# Patient Record
Sex: MALE | Race: WHITE | NOT HISPANIC OR LATINO | Employment: OTHER | URBAN - METROPOLITAN AREA
[De-identification: names, ages, dates, MRNs, and addresses within clinical notes are randomized per-mention and may not be internally consistent; named-entity substitution may affect disease eponyms.]

---

## 2017-02-04 ENCOUNTER — HOSPITAL ENCOUNTER (EMERGENCY)
Facility: CLINIC | Age: 47
Discharge: HOME | End: 2017-02-04
Attending: EMERGENCY MEDICINE | Admitting: EMERGENCY MEDICINE
Payer: MEDICAID

## 2017-02-04 ENCOUNTER — APPOINTMENT (OUTPATIENT)
Dept: ULTRASOUND IMAGING | Facility: CLINIC | Age: 47
End: 2017-02-04
Attending: EMERGENCY MEDICINE
Payer: MEDICAID

## 2017-02-04 VITALS
OXYGEN SATURATION: 97 % | BODY MASS INDEX: 23.1 KG/M2 | RESPIRATION RATE: 18 BRPM | HEART RATE: 66 BPM | HEIGHT: 71 IN | TEMPERATURE: 98.6 F | WEIGHT: 165 LBS | SYSTOLIC BLOOD PRESSURE: 125 MMHG | DIASTOLIC BLOOD PRESSURE: 73 MMHG

## 2017-02-04 DIAGNOSIS — N45.1 EPIDIDYMITIS, RIGHT: ICD-10-CM

## 2017-02-04 LAB
ALBUMIN UR-MCNC: NEGATIVE MG/DL
APPEARANCE UR: CLEAR
BACTERIA #/AREA URNS HPF: ABNORMAL /HPF
BILIRUB UR QL STRIP: NEGATIVE
COLOR UR AUTO: YELLOW
GLUCOSE UR STRIP-MCNC: NEGATIVE MG/DL
HGB UR QL STRIP: ABNORMAL
KETONES UR STRIP-MCNC: NEGATIVE MG/DL
LEUKOCYTE ESTERASE UR QL STRIP: ABNORMAL
MUCOUS THREADS #/AREA URNS LPF: PRESENT /LPF
NITRATE UR QL: NEGATIVE
PH UR STRIP: 5.5 PH (ref 5–7)
RBC #/AREA URNS AUTO: 19 /HPF (ref 0–2)
SP GR UR STRIP: 1.02 (ref 1–1.03)
SQUAMOUS #/AREA URNS AUTO: <1 /HPF (ref 0–1)
URN SPEC COLLECT METH UR: ABNORMAL
UROBILINOGEN UR STRIP-MCNC: NORMAL MG/DL (ref 0–2)
WBC #/AREA URNS AUTO: 22 /HPF (ref 0–2)

## 2017-02-04 PROCEDURE — 81001 URINALYSIS AUTO W/SCOPE: CPT | Performed by: EMERGENCY MEDICINE

## 2017-02-04 PROCEDURE — 25000132 ZZH RX MED GY IP 250 OP 250 PS 637: Performed by: EMERGENCY MEDICINE

## 2017-02-04 PROCEDURE — 87086 URINE CULTURE/COLONY COUNT: CPT | Performed by: EMERGENCY MEDICINE

## 2017-02-04 PROCEDURE — 99284 EMERGENCY DEPT VISIT MOD MDM: CPT | Mod: 25

## 2017-02-04 PROCEDURE — 87088 URINE BACTERIA CULTURE: CPT | Performed by: EMERGENCY MEDICINE

## 2017-02-04 PROCEDURE — 93976 VASCULAR STUDY: CPT

## 2017-02-04 PROCEDURE — 87591 N.GONORRHOEAE DNA AMP PROB: CPT | Performed by: EMERGENCY MEDICINE

## 2017-02-04 PROCEDURE — 87186 SC STD MICRODIL/AGAR DIL: CPT | Performed by: EMERGENCY MEDICINE

## 2017-02-04 PROCEDURE — 87491 CHLMYD TRACH DNA AMP PROBE: CPT | Performed by: EMERGENCY MEDICINE

## 2017-02-04 RX ORDER — HYDROCODONE BITARTRATE AND ACETAMINOPHEN 5; 325 MG/1; MG/1
1 TABLET ORAL ONCE
Status: COMPLETED | OUTPATIENT
Start: 2017-02-04 | End: 2017-02-04

## 2017-02-04 RX ORDER — OXYCODONE HYDROCHLORIDE 5 MG/1
5 TABLET ORAL EVERY 6 HOURS PRN
Qty: 12 TABLET | Refills: 0 | Status: ON HOLD | OUTPATIENT
Start: 2017-02-04 | End: 2017-02-09

## 2017-02-04 RX ORDER — DOXYCYCLINE 100 MG/1
100 CAPSULE ORAL 2 TIMES DAILY
Qty: 14 CAPSULE | Refills: 0 | Status: ON HOLD | OUTPATIENT
Start: 2017-02-04 | End: 2017-02-09

## 2017-02-04 RX ORDER — IBUPROFEN 600 MG/1
600 TABLET, FILM COATED ORAL ONCE
Status: COMPLETED | OUTPATIENT
Start: 2017-02-04 | End: 2017-02-04

## 2017-02-04 RX ORDER — LIDOCAINE/PRILOCAINE 2.5 %-2.5%
1 CREAM (GRAM) TOPICAL ONCE
Status: DISCONTINUED | OUTPATIENT
Start: 2017-02-04 | End: 2017-02-04 | Stop reason: HOSPADM

## 2017-02-04 RX ORDER — IBUPROFEN 600 MG/1
600 TABLET, FILM COATED ORAL EVERY 6 HOURS PRN
Qty: 30 TABLET | Refills: 1 | Status: ON HOLD | OUTPATIENT
Start: 2017-02-04 | End: 2017-02-09

## 2017-02-04 RX ORDER — LIDOCAINE/PRILOCAINE 2.5 %-2.5%
CREAM (GRAM) TOPICAL
Status: DISCONTINUED
Start: 2017-02-04 | End: 2017-02-04 | Stop reason: HOSPADM

## 2017-02-04 RX ADMIN — IBUPROFEN 600 MG: 600 TABLET ORAL at 10:12

## 2017-02-04 RX ADMIN — HYDROCODONE BITARTRATE AND ACETAMINOPHEN 1 TABLET: 5; 325 TABLET ORAL at 10:12

## 2017-02-04 RX ADMIN — HYDROCODONE BITARTRATE AND ACETAMINOPHEN 1 TABLET: 5; 325 TABLET ORAL at 11:43

## 2017-02-04 ASSESSMENT — ENCOUNTER SYMPTOMS
DYSURIA: 0
FEVER: 0

## 2017-02-04 NOTE — DISCHARGE INSTRUCTIONS
Epididymitis  Inflammation of the epididymis can cause pain and swelling in your scrotum. The epididymis is a small tube next to the testicle that stores sperm. Epididymitis is usually caused by an infection. In sexually active men, it is often caused by a sexually transmitted disease (STD) such as chlamydia or gonorrhea. In boys and in men over 40, it can be from bacteria from other parts of the urinary tract (not an STD infection).  Symptoms may begin with pain in the lower belly (abdomen) or low back. The pain then spreads down into the scrotum. Usually only one side is affected. The testicle and scrotum swell and become very painful. You may have fever and a burning when passing urine. Sometimes you may have a discharge from the penis.  Treatment is with antibiotics, and anti-inflammatory and pain medicines. The condition should get better over the first few days of treatment. But it will take several weeks for all the swelling and discomfort to go away. If your healthcare provider suspects that an STD is the cause, your sexual partners must also be treated.  Home care  The following will help you care for yourself at home:    Support the scrotum. When lying down, place a rolled towel under the scrotum. When walking, use an athletic supporter or 2 pairs of jockey-style underwear.    To relieve pain, put ice packs on the inflamed area. You can make your own ice pack by putting ice cubes in a sealed plastic bag wrapped in a towel.    You may use acetaminophen or ibuprofen to control pain, unless another medicine was prescribed. If you have chronic liver or kidney disease, talk with your healthcare provider before taking these medicines. Also talk with your provider if you've ever had a stomach ulcer or GI bleeding.    Rest in bed for the first few days until the fever, pain, and swelling get better. It may take several weeks for all of the swelling to go away.    Constipation can make you strain. This makes the  pain worse. Avoid constipation by eating natural laxatives such as prunes, fresh fruits, and whole-grain cereals. If necessary, use a mild over-the-counter laxative for constipation. Mineral oil can be used to keep the stools soft.    Do not have sex until you have finished all treatment and all symptoms have cleared.    Take all medicine as directed. Do not miss any doses and do not stop early even if you feel better.  Follow-up care  Follow up with your doctor, a urologist, or as advised by our staff to be sure you are responding properly to treatment. If a culture was taken, you may call for the result as directed. A culture test can ensure that you are on the correct antibiotic.   When to seek medical advice  Call your healthcare provider right away if any of these occur:    Fever of more than 100.4 F (38.0 C) after 3 days of treatment    Increasing pain or swelling of the testicle after starting treatment    Pressure or pain in your bladder that gets worse    Unable to pass urine for 8 hours    3274-1148 [employer]. [address] [place] [address], [place] [address]. All rights reserved. This information is not intended as a substitute for professional medical care. Always follow your healthcare professional's instructions.

## 2017-02-04 NOTE — ED AVS SNAPSHOT
Emergency Department    [address] [place] [address]    Phone:  [phone]    Fax:  [phone]                                       Frandy Lemus   MRN: [id number]    Department:   Emergency Department   Date of Visit:  2/4/2017           Patient Information     Date Of Birth          1970        Your diagnoses for this visit were:     Epididymitis, right        You were seen by Judson Lopez MD.      Follow-up Information     Follow up with [facility] In 1 week.    Why:  As needed    Contact information:    [address]  [address]  [place] [phone]        Discharge Instructions         Epididymitis  Inflammation of the epididymis can cause pain and swelling in your scrotum. The epididymis is a small tube next to the testicle that stores sperm. Epididymitis is usually caused by an infection. In sexually active men, it is often caused by a sexually transmitted disease (STD) such as chlamydia or gonorrhea. In boys and in men over 40, it can be from bacteria from other parts of the urinary tract (not an STD infection).  Symptoms may begin with pain in the lower belly (abdomen) or low back. The pain then spreads down into the scrotum. Usually only one side is affected. The testicle and scrotum swell and become very painful. You may have fever and a burning when passing urine. Sometimes you may have a discharge from the penis.  Treatment is with antibiotics, and anti-inflammatory and pain medicines. The condition should get better over the first few days of treatment. But it will take several weeks for all the swelling and discomfort to go away. If your healthcare provider suspects that an STD is the cause, your sexual partners must also be treated.  Home care  The following will help you care for yourself at home:    Support the scrotum. When lying down, place a rolled towel under the scrotum. When walking, use an athletic supporter or  2 pairs of jockey-style underwear.    To relieve pain, put ice packs on the inflamed area. You can make your own ice pack by putting ice cubes in a sealed plastic bag wrapped in a towel.    You may use acetaminophen or ibuprofen to control pain, unless another medicine was prescribed. If you have chronic liver or kidney disease, talk with your healthcare provider before taking these medicines. Also talk with your provider if you've ever had a stomach ulcer or GI bleeding.    Rest in bed for the first few days until the fever, pain, and swelling get better. It may take several weeks for all of the swelling to go away.    Constipation can make you strain. This makes the pain worse. Avoid constipation by eating natural laxatives such as prunes, fresh fruits, and whole-grain cereals. If necessary, use a mild over-the-counter laxative for constipation. Mineral oil can be used to keep the stools soft.    Do not have sex until you have finished all treatment and all symptoms have cleared.    Take all medicine as directed. Do not miss any doses and do not stop early even if you feel better.  Follow-up care  Follow up with your doctor, a urologist, or as advised by our staff to be sure you are responding properly to treatment. If a culture was taken, you may call for the result as directed. A culture test can ensure that you are on the correct antibiotic.   When to seek medical advice  Call your healthcare provider right away if any of these occur:    Fever of more than 100.4 F (38.0 C) after 3 days of treatment    Increasing pain or swelling of the testicle after starting treatment    Pressure or pain in your bladder that gets worse    Unable to pass urine for 8 hours    4056-6503 [employer]. [address] [place] [address], [place] [address]. All rights reserved. This information is not intended as a substitute for professional medical care. Always follow your healthcare professional's instructions.          24 Hour  Appointment Hotline       To make an appointment at any [facility], call [phone] ([phone]). If you don't have a family doctor or clinic, we will help you find one. [facility] are conveniently located to serve the needs of you and your family.             Review of your medicines      START taking        Dose / Directions Last dose taken    doxycycline 100 MG capsule   Commonly known as:  VIBRAMYCIN   Dose:  100 mg   Quantity:  14 capsule        Take 1 capsule (100 mg) by mouth 2 times daily for 14 days   Refills:  0        ibuprofen 600 MG tablet   Commonly known as:  ADVIL/MOTRIN   Dose:  600 mg   Quantity:  30 tablet        Take 1 tablet (600 mg) by mouth every 6 hours as needed for moderate pain   Refills:  1        oxyCODONE 5 MG IR tablet   Commonly known as:  ROXICODONE   Dose:  5 mg   Quantity:  12 tablet        Take 1 tablet (5 mg) by mouth every 6 hours as needed for pain   Refills:  0                Prescriptions were sent or printed at these locations (3 Prescriptions)                   Other Prescriptions                Printed at Department/Unit printer (3 of 3)         doxycycline (VIBRAMYCIN) 100 MG capsule               ibuprofen (ADVIL/MOTRIN) 600 MG tablet               oxyCODONE (ROXICODONE) 5 MG IR tablet                Procedures and tests performed during your visit     Chlamydia trachomatis PCR    Neisseria gonorrhoeae PCR    UA with Microscopic reflex to Culture    US Testicular & Scrotum w Doppler Ltd    Urine Culture Aerobic Bacterial      Orders Needing Specimen Collection     None      Pending Results     Date and Time Order Name Status Description    2/4/2017 1015 Urine Culture Aerobic Bacterial In process     2/4/2017 1001 Chlamydia trachomatis PCR In process     2/4/2017 1001 Neisseria gonorrhoeae PCR In process             Pending Culture Results     Date and Time Order Name Status Description    2/4/2017 1015 Urine Culture Aerobic Bacterial In process      2/4/2017 1001 Chlamydia trachomatis PCR In process     2/4/2017 1001 Neisseria gonorrhoeae PCR In process        Test Results from your hospital stay           2/4/2017 10:38 AM - Interface, Flexilab Results      Component Results     Component Value Ref Range & Units Status    Color Urine Yellow  Final    Appearance Urine Clear  Final    Glucose Urine Negative NEG mg/dL Final    Bilirubin Urine Negative NEG Final    Ketones Urine Negative NEG mg/dL Final    Specific Gravity Urine 1.020 1.003 - 1.035 Final    Blood Urine Trace (A) NEG Final    pH Urine 5.5 5.0 - 7.0 pH Final    Protein Albumin Urine Negative NEG mg/dL Final    Urobilinogen mg/dL Normal 0.0 - 2.0 mg/dL Final    Nitrite Urine Negative NEG Final    Leukocyte Esterase Urine Small (A) NEG Final    Source Clean catch urine  Final    WBC Urine 22 (H) 0 - 2 /HPF Final    RBC Urine 19 (H) 0 - 2 /HPF Final    Bacteria Urine Few (A) NEG /HPF Final    Squamous Epithelial /HPF Urine <1 0 - 1 /HPF Final    Mucous Urine Present (A) NEG /LPF Final         2/4/2017 10:26 AM - Interface, Flexilab Results         2/4/2017 10:26 AM - Interface, Flexilab Results               2/4/2017 10:46 AM - Interface, Flexilab Results         2/4/2017 11:34 AM - Interface, Radiant Ib      Narrative     TESTICULAR ULTRASOUND WITH DOPPLER EVALUATION   2/4/2017 11:21 AM    HISTORY: Right-sided pain.    COMPARISON: An ultrasound on 4/21/2015. At that time, there was  suspected left epididymitis and possible mild orchitis.    FINDINGS: The right testicle measures 4.1 x 2.9 x 3.1 cm and the left  measures 4.3 x 2.6 x 2.2 cm. The testicles demonstrate normal  echogenicity with no abnormal mass seen. Doppler spectral waveform  analysis demonstrates normal blood flow bilaterally.    No extratesticular pathology is seen. The epididymides are normal and  no hydrocele or varicocele is seen.        Impression     IMPRESSION: Unremarkable testicular ultrasound.     TREVOR SPENCER MD                 Clinical Quality Measure: Blood Pressure Screening     Your blood pressure was checked while you were in the emergency department today. The last reading we obtained was  BP: 125/73 mmHg . Please read the guidelines below about what these numbers mean and what you should do about them.  If your systolic blood pressure (the top number) is less than 120 and your diastolic blood pressure (the bottom number) is less than 80, then your blood pressure is normal. There is nothing more that you need to do about it.  If your systolic blood pressure (the top number) is 120-139 or your diastolic blood pressure (the bottom number) is 80-89, your blood pressure may be higher than it should be. You should have your blood pressure rechecked within a year by a primary care provider.  If your systolic blood pressure (the top number) is 140 or greater or your diastolic blood pressure (the bottom number) is 90 or greater, you may have high blood pressure. High blood pressure is treatable, but if left untreated over time it can put you at risk for heart attack, stroke, or kidney failure. You should have your blood pressure rechecked by a primary care provider within the next 4 weeks.  If your provider in the emergency department today gave you specific instructions to follow-up with your doctor or provider even sooner than that, you should follow that instruction and not wait for up to 4 weeks for your follow-up visit.        Thank you for choosing [facility]       Thank you for choosing [facility] for your care. Our goal is always to provide you with excellent care. Hearing back from our patients is one way we can continue to improve our services. Please take a few minutes to complete the written survey that you may receive in the mail after you visit with us. Thank you!        [employer] Information     [employer] lets you send messages to your doctor, view your test results, renew your prescriptions, schedule appointments and  "more. To sign up, go to [email or web address] . Click on \"Log in\" on the left side of the screen, which will take you to the Welcome page. Then click on \"Sign up Now\" on the right side of the page.     You will be asked to enter the access code listed below, as well as some personal information. Please follow the directions to create your username and password.     Your access code is: [email or web address]  Expires: 2017 11:39 AM     Your access code will  in 90 days. If you need help or a new code, please call your [facility] clinic or [phone].        Care EveryWhere ID     This is your Care EveryWhere ID. This could be used by other organizations to access your [facility] medical records  [id number]        After Visit Summary       This is your record. Keep this with you and show to your community pharmacist(s) and doctor(s) at your next visit.                  "

## 2017-02-04 NOTE — ED PROVIDER NOTES
"  History     Chief Complaint:  Testicular pain      HPI   Frandy Lemus is a 46 year old male who presents with testicular pain. The patient states that he developed right testicular pain two weeks ago having intercourse where he states that there was \"crazy sex stuff with squeezing and stuff.\" He believes that there may have been trauma to his right testicle during that time.  He states that last week he had penile discharge which resolved and his pain improved. However, yesterday his testicular pain returned. He denies any recent fevers or dysuria. The patient states that he has had similar symptoms in the past for which he was placed on antibiotics. He denies any history of STIs.     Allergies:  No known drug allergies.      Medications:    The patient is currently on no regular medications.       Past Medical History:    History reviewed.  No significant past medical history.       Past Surgical History:    History reviewed. No pertinent past surgical history.      Family History:    History reviewed. No pertinent family history.      Social History:  Marital Status: Single  Presents to the ED alone  Tobacco Use: Current daily smoker, 0.50 PPD.   Alcohol Use: Yes  PCP: Yamile Allen      Review of Systems   Constitutional: Negative for fever.   Genitourinary: Positive for discharge (resolved) and testicular pain. Negative for dysuria.   All other systems reviewed and are negative.    Physical Exam   First Vitals:  BP: 138/75 mmHg  Pulse: 66  Temp: 98.6  F (37  C)  Resp: 18  Height: 180.3 cm (5' 11\")  Weight: 74.844 kg (165 lb)  SpO2: 99 %    Physical Exam   Constitutional: He is oriented to person, place, and time. He appears well-developed.   HENT:   Head: Normocephalic and atraumatic.   Right Ear: External ear normal.   Mouth/Throat: Oropharynx is clear and moist.   Eyes: Conjunctivae and EOM are normal. Pupils are equal, round, and reactive to light.   Neck: Normal range of motion. Neck supple. No JVD " present.   Cardiovascular: Normal rate, regular rhythm and normal heart sounds.    Pulmonary/Chest: Effort normal and breath sounds normal.   Abdominal: Soft. Bowel sounds are normal. He exhibits no distension. There is no tenderness. There is no rebound. Hernia confirmed negative in the right inguinal area.   Genitourinary: Penis normal. Right testis shows swelling and tenderness.   Musculoskeletal: Normal range of motion.   Lymphadenopathy:     He has no cervical adenopathy.   Neurological: He is alert and oriented to person, place, and time. He displays normal reflexes. No cranial nerve deficit. He exhibits normal muscle tone. Coordination normal.   Skin: Skin is warm and dry.   Psychiatric: He has a normal mood and affect. His behavior is normal. Judgment normal.   Vitals reviewed.      Emergency Department Course   Imaging:  US Testicular & Scrotum with Doppler:   Unremarkable testicular ultrasound.   Preliminary radiology read.     Radiographic findings were communicated with the patient who voiced understanding of the findings.    Laboratory:  UA: Clear yellow urine, Blood trace, Leukocyte Esterase small, WBC 22 (H), RBC 19 (H), Bacteria few, Mucous present, otherwise WNL   Urine Culture: pending     Neisseria gonorrhoeae PCR: Pending  Chlamydia Trachomatis PCR: Pending    Interventions:  Norco, 5-325 mg, PO  Ibuprofen, 600 mg, PO     Emergency Department Course:  Nursing notes and vitals reviewed.  I performed an exam of the patient as documented above.   Urine sample was obtained and sent for laboratory analysis, findings above.   The patient was sent for a Testicular US while in the emergency department, findings above.   (1128) I rechecked on the patient and updated him on results.    Findings and plan explained to the patient. Patient discharged home with instructions regarding supportive care, medications, and reasons to return. The importance of close follow-up was reviewed. The patient was prescribed  Vibramycin, Ibuprofen, and Oxycodone.      Impression & Plan    Medical Decision Making:  The patient presents with right testicular pain. Examination is suspicious for epididymitis. Due to history of sexual contact suspect chlamydia G and C will be performed. Ultrasound is performed due to patient's history of vague trauma to the testicle during intercourse. If structure of the testicle is normal will recommend discharge, antibiotics, and follow up with urology.     Diagnosis:    ICD-10-CM    1. Epididymitis, right N45.1        Disposition:  discharged to home    Discharge Medications:  New Prescriptions    DOXYCYCLINE (VIBRAMYCIN) 100 MG CAPSULE    Take 1 capsule (100 mg) by mouth 2 times daily for 14 days    IBUPROFEN (ADVIL/MOTRIN) 600 MG TABLET    Take 1 tablet (600 mg) by mouth every 6 hours as needed for moderate pain    OXYCODONE (ROXICODONE) 5 MG IR TABLET    Take 1 tablet (5 mg) by mouth every 6 hours as needed for pain         Leticia GALO, am serving as a scribe on 2/4/2017 at 9:54 AM to personally document services performed by Dr. Lopez based on my observations and the provider's statements to me.    2/4/2017    EMERGENCY DEPARTMENT        Judson Lopez MD  02/08/17 1023

## 2017-02-04 NOTE — ED AVS SNAPSHOT
Emergency Department    [address] [facility] [address]    Phone:  [phone]    Fax:  [phone]                                       Frandy Lemus   MRN: [id number]    Department:   Emergency Department   Date of Visit:  2/4/2017           After Visit Summary Signature Page     I have received my discharge instructions, and my questions have been answered. I have discussed any challenges I see with this plan with the nurse or doctor.    ..........................................................................................................................................  Patient/Patient Representative Signature      ..........................................................................................................................................  Patient Representative Print Name and Relationship to Patient    ..................................................               ................................................  Date                                            Time    ..........................................................................................................................................  Reviewed by Signature/Title    ...................................................              ..............................................  Date                                                            Time

## 2017-02-05 LAB
BACTERIA SPEC CULT: ABNORMAL
C TRACH DNA SPEC QL NAA+PROBE: NORMAL
Lab: ABNORMAL
MICRO REPORT STATUS: ABNORMAL
MICROORGANISM SPEC CULT: ABNORMAL
N GONORRHOEA DNA SPEC QL NAA+PROBE: NORMAL
SPECIMEN SOURCE: ABNORMAL
SPECIMEN SOURCE: NORMAL
SPECIMEN SOURCE: NORMAL

## 2017-02-06 ENCOUNTER — TELEPHONE (OUTPATIENT)
Dept: EMERGENCY MEDICINE | Facility: CLINIC | Age: 47
End: 2017-02-06

## 2017-02-06 RX ORDER — CIPROFLOXACIN 500 MG/1
500 TABLET, FILM COATED ORAL 2 TIMES DAILY
Qty: 20 TABLET | Refills: 0 | Status: CANCELLED | OUTPATIENT
Start: 2017-02-06 | End: 2017-02-16

## 2017-02-06 NOTE — TELEPHONE ENCOUNTER
"[facility] Emergency Department Lab result notification [Adult-Male]    [facility] ED lab result protocol used  Urine Culture Protcol    Reason for call  Notify of lab results, assess symptoms,  review ED providers recommendations/discharge instructions (if necessary) and advise per ED lab result f/u protocol  Pt to STOP doxy and Start Cipro see below.    Lab Result (including Rx patient on, if applicable)  Final Urine Culture Report on 02/06/2017  [place] ED discharge antibiotic:Doxycycline 100 mg PO tablet, Take 1 capsule (100 mg) by mouth 2 times daily for 14 days (epididymitis)  #1. Bacteria,  50,000 to 100,000 colonies/mL Klebsiella pneumoniae,  is NOT TESTED to ED discharge antibiotic.   Change in treatment as per [place] ED Lab result protocol.    Information table from ED Provider visit on 02/04/2017   ED diagnosis: Epididymitis, right   ED provider Judson Faith   Symptoms reported at ED visit (Chief complaint, HPI) a 46 year old male who presents with testicular pain. The patient states that he developed right testicular pain two weeks ago having intercourse where he states that there was \"crazy sex stuff with squeezing and stuff.\" He believes that there may have been trauma to his right testicle during that time.  He states that last week he had penile discharge which resolved and his pain improved. However, yesterday his testicular pain returned. He denies any recent fevers or dysuria. The patient states that he has had similar symptoms in the past for which he was placed on antibiotics. He denies any history of STIs   ED providers Impression and Plan (applicable information) patient presents with right testicular pain. Examination is suspicious for epididymitis. Due to history of sexual contact suspect chlamydia G and C will be performed. Ultrasound is performed due to patient's history of vague trauma to the testicle during intercourse. If structure of the testicle is normal will recommend " discharge, antibiotics, and follow up with urology.    Significant Medical hx, if applicable Reviewed   Coumadin/Warfarin [Yes or No] no   Creatinine Level (mg/dl) Unknown   Creatinine clearance (ml/min), if applicable unknown   Allergies NKA   Weight, if applicable 74.8 Kg      RN Assessment (Patient s current Symptoms), include time called.  [Insert Left message here if message left]  At 1550 Left voicemail message requesting a call back to [phone] between 10 a.m. and 6:30 p.m. for patient's ED/UC lab results.      [place] [facility] Provider Name & Recommendations (included time consulted)  At 1544 consulted Dr. Sherman,  ED provider, stop doxy start Cipro 500 mg BID for 10 days.           Windy Kohli, RN  [place] [facility] RN  Lung Nodule and ED Lab Result F/u RN  Epic pool (ED late result f/u RN): [id number]  [facility] INCIDENTAL RADIOLOGY F/U NURSES: P [id number]  # [phone]       Copy of Lab result   Exam Information      Exam Date Exam Time Accession # Results      2/4/17 10:15 AM [id number]        Component Results      Component     Specimen Description     Midstream Urine     Special Requests     Specimen received in preservative     Culture Micro (Abnormal)     50,000 to 100,000 colonies/mL Klebsiella pneumoniae     Micro Report Status     FINAL 02/05/2017     Organism:     50,000 to 100,000 colonies/mL Klebsiella pneumoniae       Culture & Susceptibility      50,000 ,000 COLONIES/ML KLEBSIELLA PNEUMONIAE (RIANA)      Antibiotic Sensitivity Unit Status     AMPICILLIN >=32 Resistant ug/mL Final     AMPICILLIN/SULBACTAM 8 Susceptible ug/mL Final     CEFAZOLIN <=4 Susceptible  Cefazolin RIANA breakpoints are for the treatment of uncomplicated urinary tract   infections.  For the treatment of systemic infections, please contact the   laboratory for additional testing. ug/mL Final     CEFEPIME <=1 Susceptible ug/mL Final     CEFOXITIN <=4 Susceptible ug/mL Final     CEFTAZIDIME <=1  Susceptible ug/mL Final     CEFTRIAXONE <=1 Susceptible ug/mL Final     CIPROFLOXACIN <=0.25 Susceptible ug/mL Final     GENTAMICIN <=1 Susceptible ug/mL Final     LEVOFLOXACIN <=0.12 Susceptible ug/mL Final     NITROFURANTOIN 64 Intermediate ug/mL Final     Piperacillin/Tazo <=4 Susceptible ug/mL Final     TOBRAMYCIN <=1 Susceptible ug/mL Final     Trimethoprim/Sulfa <=1/19 Susceptible ug/mL Final

## 2017-02-07 ENCOUNTER — HOSPITAL ENCOUNTER (INPATIENT)
Facility: CLINIC | Age: 47
LOS: 2 days | Discharge: HOME | DRG: 728 | End: 2017-02-09
Attending: EMERGENCY MEDICINE | Admitting: INTERNAL MEDICINE
Payer: MEDICAID

## 2017-02-07 ENCOUNTER — APPOINTMENT (OUTPATIENT)
Dept: ULTRASOUND IMAGING | Facility: CLINIC | Age: 47
DRG: 728 | End: 2017-02-07
Attending: EMERGENCY MEDICINE
Payer: MEDICAID

## 2017-02-07 DIAGNOSIS — N45.1 ACUTE EPIDIDYMITIS: ICD-10-CM

## 2017-02-07 DIAGNOSIS — N43.3 HYDROCELE OF TESTIS: ICD-10-CM

## 2017-02-07 DIAGNOSIS — N45.2 ORCHITIS OF RIGHT TESTICLE: ICD-10-CM

## 2017-02-07 PROBLEM — N45.3 EPIDIDYMOORCHITIS: Status: ACTIVE | Noted: 2017-02-07

## 2017-02-07 LAB
ALBUMIN UR-MCNC: 30 MG/DL
APPEARANCE UR: CLEAR
BACTERIA #/AREA URNS HPF: ABNORMAL /HPF
BASOPHILS # BLD AUTO: 0 10E9/L (ref 0–0.2)
BASOPHILS NFR BLD AUTO: 0.1 %
BILIRUB UR QL STRIP: NEGATIVE
COLOR UR AUTO: YELLOW
DIFFERENTIAL METHOD BLD: ABNORMAL
EOSINOPHIL # BLD AUTO: 0 10E9/L (ref 0–0.7)
EOSINOPHIL NFR BLD AUTO: 0.1 %
ERYTHROCYTE [DISTWIDTH] IN BLOOD BY AUTOMATED COUNT: 14.6 % (ref 10–15)
GLUCOSE UR STRIP-MCNC: NEGATIVE MG/DL
HCT VFR BLD AUTO: 37.2 % (ref 40–53)
HGB BLD-MCNC: 12.6 G/DL (ref 13.3–17.7)
HGB UR QL STRIP: NEGATIVE
IMM GRANULOCYTES # BLD: 0.1 10E9/L (ref 0–0.4)
IMM GRANULOCYTES NFR BLD: 0.4 %
KETONES UR STRIP-MCNC: NEGATIVE MG/DL
LEUKOCYTE ESTERASE UR QL STRIP: NEGATIVE
LYMPHOCYTES # BLD AUTO: 0.9 10E9/L (ref 0.8–5.3)
LYMPHOCYTES NFR BLD AUTO: 3.5 %
MCH RBC QN AUTO: 30.9 PG (ref 26.5–33)
MCHC RBC AUTO-ENTMCNC: 33.9 G/DL (ref 31.5–36.5)
MCV RBC AUTO: 91 FL (ref 78–100)
MONOCYTES # BLD AUTO: 1.8 10E9/L (ref 0–1.3)
MONOCYTES NFR BLD AUTO: 7.1 %
MUCOUS THREADS #/AREA URNS LPF: PRESENT /LPF
NEUTROPHILS # BLD AUTO: 21.8 10E9/L (ref 1.6–8.3)
NEUTROPHILS NFR BLD AUTO: 88.8 %
NITRATE UR QL: NEGATIVE
NRBC # BLD AUTO: 0 10*3/UL
NRBC BLD AUTO-RTO: 0 /100
PH UR STRIP: 5.5 PH (ref 5–7)
PLATELET # BLD AUTO: 258 10E9/L (ref 150–450)
RBC # BLD AUTO: 4.08 10E12/L (ref 4.4–5.9)
RBC #/AREA URNS AUTO: 3 /HPF (ref 0–2)
SP GR UR STRIP: 1.02 (ref 1–1.03)
SQUAMOUS #/AREA URNS AUTO: 1 /HPF (ref 0–1)
URN SPEC COLLECT METH UR: ABNORMAL
UROBILINOGEN UR STRIP-MCNC: NORMAL MG/DL (ref 0–2)
WBC # BLD AUTO: 24.6 10E9/L (ref 4–11)
WBC #/AREA URNS AUTO: 10 /HPF (ref 0–2)

## 2017-02-07 PROCEDURE — 99285 EMERGENCY DEPT VISIT HI MDM: CPT | Mod: 25

## 2017-02-07 PROCEDURE — 25000128 H RX IP 250 OP 636: Performed by: EMERGENCY MEDICINE

## 2017-02-07 PROCEDURE — 25000132 ZZH RX MED GY IP 250 OP 250 PS 637: Performed by: INTERNAL MEDICINE

## 2017-02-07 PROCEDURE — 96376 TX/PRO/DX INJ SAME DRUG ADON: CPT

## 2017-02-07 PROCEDURE — 99223 1ST HOSP IP/OBS HIGH 75: CPT | Mod: AI | Performed by: INTERNAL MEDICINE

## 2017-02-07 PROCEDURE — 93976 VASCULAR STUDY: CPT

## 2017-02-07 PROCEDURE — 25000128 H RX IP 250 OP 636: Performed by: INTERNAL MEDICINE

## 2017-02-07 PROCEDURE — 96375 TX/PRO/DX INJ NEW DRUG ADDON: CPT

## 2017-02-07 PROCEDURE — 96365 THER/PROPH/DIAG IV INF INIT: CPT

## 2017-02-07 PROCEDURE — 81001 URINALYSIS AUTO W/SCOPE: CPT | Performed by: EMERGENCY MEDICINE

## 2017-02-07 PROCEDURE — 12000000 ZZH R&B MED SURG/OB

## 2017-02-07 PROCEDURE — 87086 URINE CULTURE/COLONY COUNT: CPT | Performed by: EMERGENCY MEDICINE

## 2017-02-07 PROCEDURE — 25000132 ZZH RX MED GY IP 250 OP 250 PS 637: Performed by: PHYSICIAN ASSISTANT

## 2017-02-07 PROCEDURE — 85025 COMPLETE CBC W/AUTO DIFF WBC: CPT | Performed by: EMERGENCY MEDICINE

## 2017-02-07 PROCEDURE — 87389 HIV-1 AG W/HIV-1&-2 AB AG IA: CPT | Performed by: EMERGENCY MEDICINE

## 2017-02-07 RX ORDER — PROCHLORPERAZINE MALEATE 5 MG
5-10 TABLET ORAL EVERY 6 HOURS PRN
Status: DISCONTINUED | OUTPATIENT
Start: 2017-02-07 | End: 2017-02-09 | Stop reason: HOSPADM

## 2017-02-07 RX ORDER — IBUPROFEN 400 MG/1
400 TABLET, FILM COATED ORAL EVERY 6 HOURS PRN
Status: DISCONTINUED | OUTPATIENT
Start: 2017-02-07 | End: 2017-02-09 | Stop reason: HOSPADM

## 2017-02-07 RX ORDER — ACETAMINOPHEN 650 MG/1
650 SUPPOSITORY RECTAL EVERY 4 HOURS PRN
Status: DISCONTINUED | OUTPATIENT
Start: 2017-02-07 | End: 2017-02-09 | Stop reason: HOSPADM

## 2017-02-07 RX ORDER — OXYCODONE HYDROCHLORIDE 5 MG/1
5-10 TABLET ORAL EVERY 4 HOURS PRN
Status: DISCONTINUED | OUTPATIENT
Start: 2017-02-07 | End: 2017-02-09 | Stop reason: HOSPADM

## 2017-02-07 RX ORDER — SODIUM CHLORIDE 9 MG/ML
INJECTION, SOLUTION INTRAVENOUS CONTINUOUS
Status: DISCONTINUED | OUTPATIENT
Start: 2017-02-07 | End: 2017-02-09 | Stop reason: HOSPADM

## 2017-02-07 RX ORDER — AMOXICILLIN 250 MG
1-2 CAPSULE ORAL 2 TIMES DAILY PRN
Status: DISCONTINUED | OUTPATIENT
Start: 2017-02-07 | End: 2017-02-09 | Stop reason: HOSPADM

## 2017-02-07 RX ORDER — NALOXONE HYDROCHLORIDE 0.4 MG/ML
.1-.4 INJECTION, SOLUTION INTRAMUSCULAR; INTRAVENOUS; SUBCUTANEOUS
Status: DISCONTINUED | OUTPATIENT
Start: 2017-02-07 | End: 2017-02-09 | Stop reason: HOSPADM

## 2017-02-07 RX ORDER — PROCHLORPERAZINE 25 MG
25 SUPPOSITORY, RECTAL RECTAL EVERY 12 HOURS PRN
Status: DISCONTINUED | OUTPATIENT
Start: 2017-02-07 | End: 2017-02-09 | Stop reason: HOSPADM

## 2017-02-07 RX ORDER — ONDANSETRON 4 MG/1
4 TABLET, ORALLY DISINTEGRATING ORAL EVERY 6 HOURS PRN
Status: DISCONTINUED | OUTPATIENT
Start: 2017-02-07 | End: 2017-02-09 | Stop reason: HOSPADM

## 2017-02-07 RX ORDER — POLYETHYLENE GLYCOL 3350 17 G/17G
17 POWDER, FOR SOLUTION ORAL DAILY PRN
Status: DISCONTINUED | OUTPATIENT
Start: 2017-02-07 | End: 2017-02-09 | Stop reason: HOSPADM

## 2017-02-07 RX ORDER — CEFTRIAXONE 1 G/1
1 INJECTION, POWDER, FOR SOLUTION INTRAMUSCULAR; INTRAVENOUS ONCE
Status: COMPLETED | OUTPATIENT
Start: 2017-02-07 | End: 2017-02-07

## 2017-02-07 RX ORDER — ONDANSETRON 2 MG/ML
4 INJECTION INTRAMUSCULAR; INTRAVENOUS EVERY 6 HOURS PRN
Status: DISCONTINUED | OUTPATIENT
Start: 2017-02-07 | End: 2017-02-09 | Stop reason: HOSPADM

## 2017-02-07 RX ORDER — BISACODYL 10 MG
10 SUPPOSITORY, RECTAL RECTAL DAILY PRN
Status: DISCONTINUED | OUTPATIENT
Start: 2017-02-07 | End: 2017-02-09 | Stop reason: HOSPADM

## 2017-02-07 RX ORDER — HYDROMORPHONE HYDROCHLORIDE 1 MG/ML
0.5 INJECTION, SOLUTION INTRAMUSCULAR; INTRAVENOUS; SUBCUTANEOUS
Status: DISCONTINUED | OUTPATIENT
Start: 2017-02-07 | End: 2017-02-07

## 2017-02-07 RX ORDER — ACETAMINOPHEN 325 MG/1
650 TABLET ORAL EVERY 4 HOURS PRN
Status: DISCONTINUED | OUTPATIENT
Start: 2017-02-07 | End: 2017-02-09 | Stop reason: HOSPADM

## 2017-02-07 RX ORDER — CEFTRIAXONE 1 G/1
1 INJECTION, POWDER, FOR SOLUTION INTRAMUSCULAR; INTRAVENOUS EVERY 24 HOURS
Status: DISCONTINUED | OUTPATIENT
Start: 2017-02-08 | End: 2017-02-09 | Stop reason: HOSPADM

## 2017-02-07 RX ORDER — HYDROMORPHONE HYDROCHLORIDE 1 MG/ML
0.5 INJECTION, SOLUTION INTRAMUSCULAR; INTRAVENOUS; SUBCUTANEOUS ONCE
Status: COMPLETED | OUTPATIENT
Start: 2017-02-07 | End: 2017-02-07

## 2017-02-07 RX ORDER — NICOTINE 21 MG/24HR
1 PATCH, TRANSDERMAL 24 HOURS TRANSDERMAL DAILY
Status: DISCONTINUED | OUTPATIENT
Start: 2017-02-07 | End: 2017-02-09 | Stop reason: HOSPADM

## 2017-02-07 RX ORDER — HYDROMORPHONE HYDROCHLORIDE 1 MG/ML
0.2 INJECTION, SOLUTION INTRAMUSCULAR; INTRAVENOUS; SUBCUTANEOUS
Status: DISCONTINUED | OUTPATIENT
Start: 2017-02-07 | End: 2017-02-09 | Stop reason: HOSPADM

## 2017-02-07 RX ADMIN — SODIUM CHLORIDE: 9 INJECTION, SOLUTION INTRAVENOUS at 15:26

## 2017-02-07 RX ADMIN — NICOTINE 1 PATCH: 21 PATCH, EXTENDED RELEASE TRANSDERMAL at 16:05

## 2017-02-07 RX ADMIN — CEFTRIAXONE 1 G: 1 INJECTION, POWDER, FOR SOLUTION INTRAMUSCULAR; INTRAVENOUS at 10:17

## 2017-02-07 RX ADMIN — ACETAMINOPHEN 650 MG: 325 TABLET, FILM COATED ORAL at 16:05

## 2017-02-07 RX ADMIN — ACETAMINOPHEN 650 MG: 325 TABLET, FILM COATED ORAL at 21:41

## 2017-02-07 RX ADMIN — HYDROMORPHONE HYDROCHLORIDE 0.5 MG: 1 INJECTION, SOLUTION INTRAMUSCULAR; INTRAVENOUS; SUBCUTANEOUS at 13:31

## 2017-02-07 RX ADMIN — HYDROMORPHONE HYDROCHLORIDE 1 MG: 1 INJECTION, SOLUTION INTRAMUSCULAR; INTRAVENOUS; SUBCUTANEOUS at 10:17

## 2017-02-07 RX ADMIN — OXYCODONE HYDROCHLORIDE 10 MG: 5 TABLET ORAL at 16:05

## 2017-02-07 RX ADMIN — OXYCODONE HYDROCHLORIDE 10 MG: 5 TABLET ORAL at 21:42

## 2017-02-07 ASSESSMENT — ACTIVITIES OF DAILY LIVING (ADL)
FALL_HISTORY_WITHIN_LAST_SIX_MONTHS: NO
BATHING: 0-->INDEPENDENT
COGNITION: 0 - NO COGNITION ISSUES REPORTED
TRANSFERRING: 0-->INDEPENDENT
DRESS: 0-->INDEPENDENT
SWALLOWING: 0-->SWALLOWS FOODS/LIQUIDS WITHOUT DIFFICULTY
AMBULATION: 0-->INDEPENDENT
RETIRED_COMMUNICATION: 0-->UNDERSTANDS/COMMUNICATES WITHOUT DIFFICULTY
TOILETING: 0-->INDEPENDENT
RETIRED_EATING: 0-->INDEPENDENT

## 2017-02-07 ASSESSMENT — ENCOUNTER SYMPTOMS
VOMITING: 1
NAUSEA: 1
CHILLS: 1
FEVER: 1
DIAPHORESIS: 1

## 2017-02-07 NOTE — IP AVS SNAPSHOT
[facility] [facility] [address] [facility]    [address] [place] [address]    Phone:  [phone]                                       After Visit Summary   2/7/2017    Frandy Lemus    MRN: [id number]           After Visit Summary Signature Page     I have received my discharge instructions, and my questions have been answered. I have discussed any challenges I see with this plan with the nurse or doctor.    ..........................................................................................................................................  Patient/Patient Representative Signature      ..........................................................................................................................................  Patient Representative Print Name and Relationship to Patient    ..................................................               ................................................  Date                                            Time    ..........................................................................................................................................  Reviewed by Signature/Title    ...................................................              ..............................................  Date                                                            Time

## 2017-02-07 NOTE — H&P
"CHIEF COMPLAINT:  Right testicular pain for 2-3 weeks.     HISTORY OF PRESENT ILLNESS:  Frandy Lemus is a 46-year-old male with past medical history as detailed below who presents with the above chief complaint.      The patient reports that he had engaged in some acts with sex toys about 2-3 weeks prior to admission.  He reports that he was using an artificial vagina with some women and had his testicles inside of it along with his penis and he sustained some stretching injury at that time.  Following that he had some aching in his right testicle; however, it was not severe enough to impair him and at one point seemed as though it was improving.  It again worsened to the point that he sought care in the Emergency Department on 02/04/17 at [facility].  There he had a UA performed which was abnormal as well as gonorrhea and chlamydia testing, both of which came back negative.  He was discharged on doxycycline and received 1 dose of IM ceftriaxone as part of a \"test and treat\" management plan while awaiting results of STI testing.  His urine culture ultimately grew Klebsiella and it sounds as though there was some attempt to get him more appropriate antibiotic treatment for this; however, the patient felt like since he was already on doxycycline that he did not need this and therefore continued only on doxycycline.  Since leaving the Emergency Department on 02/04/17, he continued to have increasing right-sided testicular pain.  He also had noted some subjective fevers and chills with night sweats without measured fevers.  He had at times felt nauseated due to the severity of the pain; however, has not had any vomiting.  He otherwise denies any penile discharge.  He has had some urinary urgency and frequency but no dysuria.  These symptoms ultimately led to him being seen again in the Emergency Department.        In the Emergency Department, the patient was seen by Dr. Laurie Solomon.  His initial vital " signs were temperature of 98.6 degrees Fahrenheit, heart rate 79, blood pressure of 140/86, respiratory rate 16, SpO2 of 98% on room air.  He had workup including a CBC which demonstrated a white blood cell count of 24.6 and hemoglobin of 12.6.  His platelet count was within normal limits.  Urinalysis demonstrated 30 of protein, 10 white blood cells, 3 red blood cells, a few bacteria and mucus on midstream urine.  He underwent an ultrasound of his testicles and scrotum which demonstrated interval development of right-sided epididymal orchitis and new right-sided hydrocele containing septations which could indicate infection.  Both of these findings were new since his ultrasound on 02/04/2017.  The case was discussed with Urology and he was given 1 dose of IV ceftriaxone and admission to the hospital was requested for further cares.      PAST MEDICAL HISTORY:   1.  Polysubstance abuse including methamphetamines, cocaine, marijuana.   2.  Tobacco abuse.      SOCIAL HISTORY:  He is a current half-pack per day smoker.  He does not drink alcohol.  He has a history of marijuana use, cocaine use, methamphetamine use with most recent substance use consisting of methamphetamines.  He typically uses these about once a week with his last use 2-3 weeks prior to admission.  He almost exclusively smokes methamphetamine, although he did at least on 1 occasion 3 years ago inject this.  He reports he has been in treatment previously and currently has some ongoing legal proceedings related to this which he does not elaborate on further.      FAMILY HISTORY:  Mother had congestive heart failure in her 70s.  Brother has schizoaffective disorder.  He has not spoken to his father in over 30 years and therefore is unaware of any of his medical issues.      ALLERGIES:  No known drug allergies.      REVIEW OF SYSTEMS:  Complete 10-point review of systems assessed and negative except as noted in the HPI.      PHYSICAL EXAMINATION:   VITAL  SIGNS:  Temperature 98.8 degrees Fahrenheit, heart rate 68, blood pressure 102/67, respiratory rate 18, SpO2 of 95% on room air.     GENERAL:  Middle-aged male in no acute distress.   EYES:  Pupils equal, round, reactive to light, extraocular movements intact.   ENT:  Moist mucous membranes.  Oropharynx clear.   NECK:  Supple, no lymphadenopathy.   RESPIRATORY:  Lungs clear to auscultation bilaterally.  Normal effort.   CARDIOVASCULAR:  Regular rate and rhythm.  No murmurs, rubs or gallops.  No peripheral edema.   GASTROINTESTINAL:  Abdomen soft, nontender, nondistended.  Bowel sounds normoactive.  No rebound tenderness or guarding.   GENITOURINARY:  Significant scrotal edema with in particular right-sided testicular swelling and pain to light touch.  Otherwise, no major abnormalities on external examination.   PSYCHIATRIC:  Normal affect, appropriate.   NEUROLOGIC:  Alert.  Responding to questions appropriately.  Following all commands.   SKIN:  Warm, dry.      LABORATORY DATA:  Labs and imaging reviewed in Epic.  Pertinents included in HPI and assessment and plan.       ASSESSMENT AND PLAN:  Frandy Lemus is a 46-year-old male with past medical history significant for polysubstance abuse who presents with progressive right testicular pain and swelling, now found to have findings on imaging of new epididymal orchitis.  He is admitted on 02/07/2017 for IV antibiotics and further urologic assessment.    1.  Epididymal orchitis, likely secondary to Klebsiella pneumoniae:  His initial symptoms started following trauma during sexual activity 2-3 weeks prior to admission.  It improved somewhat, but worsened again, prompting evaluation in the Emergency Department on 02/04/2017 with normal ultrasound and negative gonorrhea and chlamydia testing at that time.  However, urine culture subsequently returned positive with Klebsiella.  He had continued only on his doxycycline from this and had worsening symptoms associated  with fevers and chills at home.  He does have a leukocytosis of 24.6, but is presently afebrile and is not septic appearing with no other systemic inflammatory response syndrome criteria met on vitals.  He has been started on IV ceftriaxone for which his previous Klebsiella organism is sensitive and will be continued.  Urology has been consulted and is recommending supportive management at this time.  Once he is improving, can likely transition to oral antibiotics.  Will have analgesics available to him as this is understandably a very painful process.  Urology is also recommending elevating the scrotum with towels to help with edema as well as ice packs to the scrotum as needed.  As his chlamydia testing has come back negative and he has worsened despite ongoing treatment with doxycycline, will discontinue this in favor of ceftriaxone for now.   2.  Polysubstance abuse, most recently methamphetamine:  He reports weekly methamphetamine use for the past at least 1 year with last use 2-3 weeks ago.  He has declined any assessment by chemical dependency here and reports that he is motivated to quit completely and feels he can do so without assistance.  He reportedly has some sort of legal proceeding in regards to this upcoming.  Will defer formal chemical dependency consult for now.   3.  Tobacco abuse:  Will have nicotine patch available and place smoking cessation consult.   4.  Human immunodeficiency virus screening:  He reports he does typically use condoms with sex and is sexually active with women only; however, he has at least 1 prior episode of IV drug use and therefore, would benefit from baseline human immunodeficiency virus screening.  I have ordered this here.  He is agreeable to testing.     Deep venous thrombosis prophylaxis:  PCDs.      CODE STATUS:  Full code.      DISPOSITION:  Will admit to inpatient status for IV antibiotics.  Anticipate greater than or equal to 2 midnights prior to discharge.          CHARLEY BENDER MD             D: 2017 15:23   T: 2017 15:58   MT: WILLI      Name:     RIKA GAVIN   MRN:      [id number]        Account:      [id number]   :      1970           Admitted:     [id number]      Document: [id number]       cc: Yamile Allen MD

## 2017-02-07 NOTE — ED NOTES
[facility]  ED Nurse Handoff Report    ED Chief complaint: Testicular/scrotal Pain      ED Diagnosis:   Final diagnoses:   Orchitis of right testicle   Acute epididymitis   Hydrocele of testis       Code Status: Full Code    Allergies: No Known Allergies    Activity level:  Independent     Needed?: No    Isolation: No  Infection: Not Applicable    Bariatric?: No      Vital Signs:   Filed Vitals:    02/07/17 0952   BP: 140/86   Pulse: 79   Temp: 98.6  F (37  C)   TempSrc: Oral   Resp: 16   Height: 1.829 m (6')   Weight: 74.844 kg (165 lb)   SpO2: 98%       Cardiac Rhythm: ,        Pain level: 0-10 Pain Scale: 10    Is this patient confused?: No    Patient Report: Initial Complaint: Pts right testicle is enlarged and painful.   Focused Assessment: pt is a&ox4  Tests Performed: Labs and ultrasound  Abnormal Results: WBC, ultrasound  Treatments provided: antibiotics    Family Comments: pt lives with his mom. She is not here at this time    OBS brochure/video discussed/provided to patient: N/A    ED Medications: -  Medications   HYDROmorphone (PF) (DILAUDID) injection 0.5 mg (not administered)   HYDROmorphone (DILAUDID) injection 1 mg (1 mg Intravenous Given 2/7/17 1017)   cefTRIAXone (ROCEPHIN) 1 g vial to attach to  mL bag for ADULTS or NS 50 mL bag for PEDS (1 g Intravenous New Bag 2/7/17 1017)   HYDROmorphone (PF) (DILAUDID) injection 0.5 mg (0.5 mg Intravenous Given 2/7/17 1331)       Drips infusing?:  No      ED NURSE PHONE NUMBER: [phone]

## 2017-02-07 NOTE — ED PROVIDER NOTES
"  History     Chief Complaint:  Testicular Pain and Swelling     HPI   Frandy Lemus is a 46 year old male who presents to the emergency department for evaluation of testicular pain and swelling. The patient says he was seen in the ED on 02/04/17 for evaluation of testicular pain. He underwent blood work and testicular ultrasound, and was discharged home on Vibramycin and Roxicodone for epididymitis. He says that shortly after being discharged his right testicle began to swell and his pain progressively worsened. Per chart review, his cultures for gonorrhea and chlamydia were both negative. Here in the ED, the patient rates his testicular pain as a 10/10 in severity. He describes subjective fevers, chills, sweating, and nausea overnight last night and says he has been vomiting \"because of the pain.\"     Relevant data:   Previous US Testicular & Scrotum with Doppler (02/04/2017)   Unremarkable testicular ultrasound.   Preliminary radiology read.      Radiographic findings were communicated with the patient who voiced understanding of the findings.    Previous Laboratory Studies (02/04/2017)  UA: Clear yellow urine, Blood trace, Leukocyte Esterase small, WBC 22 (H), RBC 19 (H), Bacteria few, Mucous present, otherwise WNL    50,000 to 100,000 colonies/mL Klebsiella pneumoniae   Antibiotic Sensitivity Unit Status     AMPICILLIN >=32 Resistant ug/mL Final     AMPICILLIN/SULBACTAM 8 Susceptible ug/mL Final     CEFAZOLIN <=4 Susceptible  Cefazolin RIANA breakpoints are for the treatment of uncomplicated urinary tract   infections.  For the treatment of systemic infections, please contact the   laboratory for additional testing. ug/mL Final     CEFEPIME <=1 Susceptible ug/mL Final     CEFOXITIN <=4 Susceptible ug/mL Final     CEFTAZIDIME <=1 Susceptible ug/mL Final     CEFTRIAXONE <=1 Susceptible ug/mL Final     CIPROFLOXACIN <=0.25 Susceptible ug/mL Final     GENTAMICIN <=1 Susceptible ug/mL Final     LEVOFLOXACIN " <=0.12 Susceptible ug/mL Final     NITROFURANTOIN 64 Intermediate ug/mL Final     Piperacillin/Tazo <=4 Susceptible ug/mL Final     TOBRAMYCIN <=1 Susceptible ug/mL Final     Trimethoprim/Sulfa <=1/19 Susceptible ug/mL Final          Allergies:  No Known Drug Allergies    Medications:    Motrin  Roxicodone  Vibramycin       Past Medical History:    History reviewed.  No pertinent past medical history.    Past Surgical History:   History reviewed.  No pertinent past surgical history.    Family History:   History reviewed. No pertinent family history.    Social History:   Tobacco use:    Current 0.50 ppd smoker  Alcohol use:    Negative  Marital status:    Single  Accompanied to ED by:  Alone     Review of Systems   Constitutional: Positive for fever, chills and diaphoresis.   Gastrointestinal: Positive for nausea and vomiting.   Genitourinary: Positive for scrotal swelling and testicular pain.   All other systems reviewed and are negative.    Physical Exam   First Vitals:  /86 mmHg  Temp(Src) 98.6  F (37  C) (Oral)  Resp 16  Ht 1.829 m (6')  Wt 74.844 kg (165 lb)  BMI 22.37 kg/m2  SpO2 98%    Physical Exam  Nursing note and vitals reviewed.  Constitutional:  Appears well-developed and well-nourished.   HENT:   Head:    Atraumatic.   Mouth/Throat:   Oropharynx is clear and moist. No oropharyngeal exudate.   Eyes:    Pupils are equal, round, and reactive to light.   Neck:    Normal range of motion. Neck supple.      No tracheal deviation present. No thyromegaly present.   Cardiovascular:  Normal rate, regular rhythm, no murmur   Pulmonary/Chest: Breath sounds are clear and equal without wheezes or crackles.  Abdominal:   Soft. Bowel sounds are normal. Exhibits no distension and      no mass. There is no tenderness.      There is no rebound and no guarding.   :   Marked swelling and tenderness of the right side of the scrotum, including right testicle and epididymis which are extremely tender. There is  pink discoloration to the right side of the scrotum. No palpable hernia. Penis appears normal. Left scrotum and testicle feel normal.   Musculoskeletal:  Exhibits no edema.   Lymphadenopathy:  No cervical adenopathy.   Neurological:   Alert and oriented to person, place, and time.   Skin:    Skin is warm and dry. No rash noted. No pallor.     Emergency Department Course     Imaging:  Radiographic findings were communicated with the patient and Admitting MD who voiced understanding of the findings.  US testicular & scrotum w/ doppler ltd:   1. Interval development of right-sided epididymoorchitis.  2. New right-sided hydrocele containing septations which could indicate infection.  Radiology report.     Laboratory:  CBC: WBC 24.6 high, neutrophil 21.8 high, monocytes 1.8 high, HGB 12.6 low, o/w WNL ()     UA: Protein albumin 30, WBC 10 high, RBC 3 high, bacteria few, mucous present, o/w negative  Urine culture: Pending    Emergency Department Course:  Nursing notes and vitals reviewed.   0946: I performed an exam of the patient as documented above.   The above workup was undertaken.   1017: Rocephin 1 g IV  1017: Dilaudid 1 mg IV  1331: Dilaudid 0.5 mg IV  1350: I spoke with Dr. Dunbar, of the urology service at, regarding the patient.   Findings and plan explained to the Patient who consents to admission. Discussed the patient with Dr. Zambrano who will admit the patient to a medical bed for further monitoring, evaluation, and treatment.   Impression & Plan      Medical Decision Making:  Frandy Lemus is a 46 year old male who I found to have a worsening right epididymitis/orchitis with a new hydrocele which could be due to infection such as reactive process but there was also concern for the possibility of early abscess so I discussed the case with urologist, Dr. Dunbar, and the patient was admitted to the medical floor under the primary care of the hospitalist, Dr. Zambrano, with urology consultation.  Dr. Dunbar felt the Ceftriaxone should be continued especially since the Klebsiella pneumoniae urine bacteria that grew from his culture is sensitive to this. He was admitted to continue pain control and antibiotic therapy.     Diagnosis:    ICD-10-CM    1. Orchitis of right testicle N45.2    2. Acute epididymitis N45.1    3. Hydrocele of testis N43.3      Disposition:  Admitted to medicine under the care of Dr. Zambrano, of the hospitalist service.       I, Alvarado Bettencourt, am serving as a scribe at 9:46 AM on 2/7/2017 to document services personally performed by Dr. Solomon, based on my observations and the provider's statements to me.   Alvarado Bettencourt  2/7/2017    EMERGENCY DEPARTMENT        Laurie Solomon MD  02/07/17 1545

## 2017-02-07 NOTE — PHARMACY-ADMISSION MEDICATION HISTORY
Admission medication history interview status for the 2/7/2017  admission is complete. See EPIC admission navigator for prior to admission medications     Medication history source reliability:Good    Actions taken by pharmacist (provider contacted, etc):None     Additional medication history information not noted on PTA med list : all medications just prescribed on 2/4/17, here in the ED, upon discharge.    Medication reconciliation/reorder completed by provider prior to medication history? No    Time spent in this activity: 5 minutes    Prior to Admission medications    Medication Sig Last Dose Taking? Auth Provider   doxycycline (VIBRAMYCIN) 100 MG capsule Take 1 capsule (100 mg) by mouth 2 times daily for 14 days 2/6/2017 at pm Yes Judson Lopez MD   ibuprofen (ADVIL/MOTRIN) 600 MG tablet Take 1 tablet (600 mg) by mouth every 6 hours as needed for moderate pain 2/6/2017 at pm Yes Judson Lopez MD   oxyCODONE (ROXICODONE) 5 MG IR tablet Take 1 tablet (5 mg) by mouth every 6 hours as needed for pain 2/6/2017 at pm Yes Judson Lopez MD

## 2017-02-07 NOTE — IP AVS SNAPSHOT
MRN:[id number]                      After Visit Summary   2/7/2017    Frandy Lemus    MRN: [id number]           Thank you!     Thank you for choosing [facility] for your care. Our goal is always to provide you with excellent care. Hearing back from our patients is one way we can continue to improve our services. Please take a few minutes to complete the written survey that you may receive in the mail after you visit with us. Thank you!        Patient Information     Date Of Birth          1970        About your hospital stay     You were admitted on:  February 7, 2017 You last received care in the:  [facility] [address] [facility]    You were discharged on:  February 9, 2017        Reason for your hospital stay       Acute orchitis and epididymitis                  Who to Call     For medical emergencies, please call 911.  For non-urgent questions about your medical care, please call your primary care provider or clinic, [phone]          Attending Provider     Provider    Laurie Solomon MD Melander, Ian M, MD       Primary Care Provider Office Phone # Fax #    Yamile Allen [phone]       [facility] [address] [place] [address]        After Care Instructions     Activity       Your activity upon discharge: activity as tolerated            Diet       Follow this diet upon discharge: Orders Placed This Encounter  Combination Diet Regular Diet Adult                  Follow-up Appointments     Follow Up       Please follow up with Dr. Gonzalez on Tuesday, Februarys 21st at 3:20pm for a routine post-hospital check and check of the right epididymitis. Please arrive 10-15 minutes prior to your scheduled appointment with a photo ID and a copy of your insurance card to fill out paperwork.     [facility]  [address] [place] [address], [address]  [place]. [address]    You may call [phone] with any questions or concerns.       "      Follow-up and recommended labs and tests        Follow up with primary care provider, Yamile Allen, within 7 days for hospital follow- up.  No follow up labs or test are needed.                  Further instructions from your care team       Appointment with DR Allen   at 10:20 am    Pending Results     No orders found from 2017 to 2017.            Statement of Approval     Ordered          17 1030  I have reviewed and agree with all the recommendations and orders detailed in this document.   EFFECTIVE NOW     Approved and electronically signed by:  Rich White MD             Admission Information        Provider Department Dept Phone    2017 Alexandro Zambrano MD [address] [facility] [phone]      Your Vitals Were     Blood Pressure Pulse Temperature    105/43 mmHg 73 98  F (36.7  C) (Oral)    Respirations Height Weight    18 1.829 m (6') 76.613 kg (168 lb 14.4 oz)    BMI (Body Mass Index) Pulse Oximetry       22.90 kg/m2 99%       MyChart Information     [employer] lets you send messages to your doctor, view your test results, renew your prescriptions, schedule appointments and more. To sign up, go to [email or web address] . Click on \"Log in\" on the left side of the screen, which will take you to the Welcome page. Then click on \"Sign up Now\" on the right side of the page.     You will be asked to enter the access code listed below, as well as some personal information. Please follow the directions to create your username and password.     Your access code is: [email or web address]  Expires: 2017 11:39 AM     Your access code will  in 90 days. If you need help or a new code, please call your [facility] clinic or [phone].        Care EveryWhere ID     This is your Care EveryWhere ID. This could be used by other organizations to access your [facility] medical records  [id number]           Review of your medicines      START taking        Dose / Directions    levofloxacin 500 " MG tablet   Commonly known as:  LEVAQUIN   Used for:  Orchitis of right testicle        Dose:  500 mg   Take 1 tablet (500 mg) by mouth daily   Quantity:  10 tablet   Refills:  0         CONTINUE these medicines which have NOT CHANGED        Dose / Directions    ibuprofen 600 MG tablet   Commonly known as:  ADVIL/MOTRIN   Used for:  Orchitis of right testicle        Dose:  600 mg   Take 1 tablet (600 mg) by mouth every 6 hours as needed for moderate pain   Quantity:  30 tablet   Refills:  0         STOP taking     doxycycline 100 MG capsule   Commonly known as:  VIBRAMYCIN           oxyCODONE 5 MG IR tablet   Commonly known as:  ROXICODONE                Where to get your medicines      These medications were sent to [facility] - [address]  [address], [place] [address]     Phone:  [phone]    - ibuprofen 600 MG tablet  - levofloxacin 500 MG tablet             Protect others around you: Learn how to safely use, store and throw away your medicines at www.disposemymeds.org.             Medication List: This is a list of all your medications and when to take them. Check marks below indicate your daily home schedule. Keep this list as a reference.      Medications           Morning Afternoon Evening Bedtime As Needed    ibuprofen 600 MG tablet   Commonly known as:  ADVIL/MOTRIN   Take 1 tablet (600 mg) by mouth every 6 hours as needed for moderate pain                                   levofloxacin 500 MG tablet   Commonly known as:  LEVAQUIN   Take 1 tablet (500 mg) by mouth daily   Next Dose Due:  Start tomorrow

## 2017-02-07 NOTE — CONSULTS
"Urology consult for: right epididymitis   Req provider: Dr. Zambrano      HPI: Pt is an unassigned 47yo male with a PMH significant for left epididymitis in 4/21/2015 which he was treated with Levo 500mg qday for 10 days, crystal meth use and recent diagnosis of right epididymitis. Pt presented to the [facility] ED originally 2/4/17 with right testicular pain. Pt had a testicular U/S, which was unremarkable, negative G&C PCRs, and a UA that showed Clear yellow urine, Blood trace, Leukocyte Esterase small, WBC 22 (H), RBC 19 (H), Bacteria few, Mucous present, otherwise WNL. UC eventually showed 50,000 to 100,000 colonies/mL Klebsiella pneumoniae. Pt was sent home with a Rx for Doxycycline 5mg PO d8ysnuh as well as Rxs for Ibuprofen and Oxycodone. Pt was also told to follow-up with urology. Pt reports he has been taking the Doxycycline as prescribed, however, he has had further right testicular pain and swelling since he was seen 2/4/17, so he presented to the ED again to be assessed.     Today, pt reports the trouble began about 2 weeks ago. Pt was smoking crystal meth in a group of friends. After smoking, there was \"crazy sex stuff with squeezing and stuff\" as well as differnet straps/devices, though no penetrative sex and his right testicle may have had some trauma from the rings on the fingers of his female partner. However, pt was not overly concerned at that time and continued with his normal activities and the pain improved. The pain did then return and worsen even after starting antibiotics. Pt also did note one episode of discharge from his penis in the days afterwards. Pt also noticed one episode of pink urine and one episode of bleeding per the meatus after urination. Pt said all of these symptoms resolved on their own, so he did not have them evaluated. Pt reports that he feels he has been emptying his bladder well without hesitancy, dysuria, pyuria, or foul smell to the urine. Pt has had some frequency and " "urgency recently and feels he \"can't hold his water lately,\" though he not had any episodes of incontinence. Pt has noted a lot pf pain and swelling in the right testicle and this has been progressively worse. The pain has caused him to have difficulty urinating, though he still feels he's emptying his bladder well. Pt also notes some RLQ pain \"like appendicitis\" or like a 10lb weight is pulling down on it. Pt also notes some pain in his lower back. Pt reports that he's had a headache for the last 3 days. Pt reports that since yesterday he's had nausea, vomiting, fevers, and chills though he is not currently having any of these symptoms. Pt feels the antibiotics he was given previously weren't strong enough and is agreeable to being admitted for IV antibiotics, though he would like to go home for food prior to admission.     Review Of Systems:  General: Confirms F/C  Respiratory: Denies SOB  Cardiovascular: Denies CP  Gastrointestinal: confirms N/V  Genitourinary: See HPI  Musculoskeletal: Denies LE pain  Neurologic: confirms HA x3 days  Psychiatric: Denies confusion  Skin: no concerning lesions    History reviewed. No pertinent past surgical history.    Social Hx:  Social History     Social History     Marital Status: Single     Spouse Name: N/A     Number of Children: N/A     Years of Education: N/A     Occupational History     Not on file.     Social History Main Topics     Smoking status: Current Every Day Smoker -- 0.50 packs/day     Smokeless tobacco: Not on file     Alcohol Use: No      Comment: daily     Drug Use: Yes     Special: Marijuana, Methamphetamines      Comment: Quit meth 1 month ago     Sexual Activity: Yes     Other Topics Concern     Not on file     Social History Narrative     Meds:  Current Outpatient Prescriptions   Medication Sig Dispense Refill     doxycycline (VIBRAMYCIN) 100 MG capsule Take 1 capsule (100 mg) by mouth 2 times daily for 14 days 14 capsule 0     ibuprofen (ADVIL/MOTRIN) 600 " MG tablet Take 1 tablet (600 mg) by mouth every 6 hours as needed for moderate pain 30 tablet 1     oxyCODONE (ROXICODONE) 5 MG IR tablet Take 1 tablet (5 mg) by mouth every 6 hours as needed for pain 12 tablet 0      No Known Allergies    Labs:  ROUTINE IP LABS (Last four results)  BMPNo lab results found in last 7 days.  CBC  Recent Labs  Lab 02/07/17  1005   WBC 24.6*   RBC 4.08*   HGB 12.6*   HCT 37.2*   MCV 91   MCH 30.9   MCHC 33.9   RDW 14.6        INRNo lab results found in last 7 days.    UA RESULTS:  Recent Labs   Lab Test  02/07/17   1125   COLOR  Yellow   APPEARANCE  Clear   URINEGLC  Negative   URINEBILI  Negative   URINEKETONE  Negative   SG  1.023   UBLD  Negative   URINEPH  5.5   PROTEIN  30*   NITRITE  Negative   LEUKEST  Negative   RBCU  3*   WBCU  10*     UC: 50,000 to 100,000 colonies/mL Klebsiella pneumoniae  50,000 ,000 COLONIES/ML KLEBSIELLA PNEUMONIAE (RIANA)      Antibiotic Sensitivity Unit Status     AMPICILLIN >=32 Resistant ug/mL Final     AMPICILLIN/SULBACTAM 8 Susceptible ug/mL Final     CEFAZOLIN <=4 Susceptible  Cefazolin RIANA breakpoints are for the treatment of uncomplicated urinary tract   infections.  For the treatment of systemic infections, please contact the   laboratory for additional testing. ug/mL Final     CEFEPIME <=1 Susceptible ug/mL Final     CEFOXITIN <=4 Susceptible ug/mL Final     CEFTAZIDIME <=1 Susceptible ug/mL Final     CEFTRIAXONE <=1 Susceptible ug/mL Final     CIPROFLOXACIN <=0.25 Susceptible ug/mL Final     GENTAMICIN <=1 Susceptible ug/mL Final     LEVOFLOXACIN <=0.12 Susceptible ug/mL Final     NITROFURANTOIN 64 Intermediate ug/mL Final     Piperacillin/Tazo <=4 Susceptible ug/mL Final     TOBRAMYCIN <=1 Susceptible ug/mL Final     Trimethoprim/Sulfa <=1/19 Susceptible ug/mL Final        Imaging:  ULTRASOUND TESTICULAR AND SCROTUM WITH DOPPLER LIMITED  2/7/2017 12:14  PM       HISTORY: Severe right testicular pain and swelling. Evaluate  for  torsion, orchitis or epididymitis.     COMPARISON: 2/4/2017.     FINDINGS: The testicles remain normal in size and morphology  bilaterally. Interval development of moderately increased blood flow  in the right testicle consistent with orchitis. Interval development  of abnormal increased blood flow in the right epididymis consistent  with epididymitis. There has also been interval development of a small  right hydrocele with multiple septations which could indicate that it  is infected. The left epididymis is normal. No left hydrocele. No  varicocele bilaterally.                                                           IMPRESSION:  1. Interval development of right-sided epididymoorchitis.  2. New right-sided hydrocele containing septations which could  indicate infection.    TESTICULAR ULTRASOUND WITH DOPPLER EVALUATION   2/4/2017 11:21 AM     HISTORY: Right-sided pain.     COMPARISON: An ultrasound on 4/21/2015. At that time, there was  suspected left epididymitis and possible mild orchitis.     FINDINGS: The right testicle measures 4.1 x 2.9 x 3.1 cm and the left  measures 4.3 x 2.6 x 2.2 cm. The testicles demonstrate normal  echogenicity with no abnormal mass seen. Doppler spectral waveform  analysis demonstrates normal blood flow bilaterally.     No extratesticular pathology is seen. The epididymides are normal and  no hydrocele or varicocele is seen.                                                                    IMPRESSION: Unremarkable testicular ultrasound.       TREVOR SPENCER MD    Exam:  /86 mmHg  Pulse 79  Temp(Src) 98.6  F (37  C) (Oral)  Resp 16  Ht 1.829 m (6')  Wt 74.844 kg (165 lb)  BMI 22.37 kg/m2  SpO2 98%    EXAM:  Constitutional: healthy, alert, no acute distress and cooperative   Cardiovascular: RRR  Respiratory: CTAB anteriorly, no secondary muscle use  Psychiatric: mentation appears normal and affect normal/bright  Neck: no asymmetry  Abdomen: +BS, abdomen soft,  non-tender. No masses, no CVAT  : Circ penis, no urinary catheter in place, no lesions, tenderness, masses, or discharge. Left testicle normal without erythema, edema, or drainage. Right testicle diffusely tender, mildly erythematous, and swollen with an area of firmness with a volume of roughly [id number].   MSK: no calf tenderness, no edema.  Skin: no open lesions, extremities warm and well perfused    Assessment/plan:    Right epididymitis, pt had left epididymitis in 2015 treated successfully with PO Levo x10 days   -Admit to hospitalist service for IV ceftriaxone   -Elevate scrotum with towels or cloths.    -Ice packs to scrotum PRN   -Ibuprofen to help with pain, swelling      Discussed exam findings, lab and imaging results and plan with Dr. Gonzalez.  Please contact me with any questions or concerns.     Yesi Dickerson PA-C  [facility]  Pager:  [phone]  After 4pm and on weekends, please call [phone]

## 2017-02-08 ENCOUNTER — APPOINTMENT (OUTPATIENT)
Dept: OCCUPATIONAL THERAPY | Facility: CLINIC | Age: 47
DRG: 728 | End: 2017-02-08
Attending: INTERNAL MEDICINE
Payer: MEDICAID

## 2017-02-08 LAB
ANION GAP SERPL CALCULATED.3IONS-SCNC: 5 MMOL/L (ref 3–14)
BACTERIA SPEC CULT: NO GROWTH
BUN SERPL-MCNC: 10 MG/DL (ref 7–30)
CALCIUM SERPL-MCNC: 8.4 MG/DL (ref 8.5–10.1)
CHLORIDE SERPL-SCNC: 102 MMOL/L (ref 94–109)
CO2 SERPL-SCNC: 30 MMOL/L (ref 20–32)
CREAT SERPL-MCNC: 0.58 MG/DL (ref 0.66–1.25)
ERYTHROCYTE [DISTWIDTH] IN BLOOD BY AUTOMATED COUNT: 14.6 % (ref 10–15)
GFR SERPL CREATININE-BSD FRML MDRD: ABNORMAL ML/MIN/1.7M2
GLUCOSE SERPL-MCNC: 108 MG/DL (ref 70–99)
HCT VFR BLD AUTO: 35.4 % (ref 40–53)
HGB BLD-MCNC: 11.7 G/DL (ref 13.3–17.7)
HIV 1+2 AB+HIV1 P24 AG SERPL QL IA: NORMAL
Lab: NORMAL
MCH RBC QN AUTO: 30.2 PG (ref 26.5–33)
MCHC RBC AUTO-ENTMCNC: 33.1 G/DL (ref 31.5–36.5)
MCV RBC AUTO: 91 FL (ref 78–100)
MICRO REPORT STATUS: NORMAL
PLATELET # BLD AUTO: 229 10E9/L (ref 150–450)
POTASSIUM SERPL-SCNC: 4.2 MMOL/L (ref 3.4–5.3)
RBC # BLD AUTO: 3.88 10E12/L (ref 4.4–5.9)
SODIUM SERPL-SCNC: 137 MMOL/L (ref 133–144)
SPECIMEN SOURCE: NORMAL
WBC # BLD AUTO: 13.4 10E9/L (ref 4–11)

## 2017-02-08 PROCEDURE — 99232 SBSQ HOSP IP/OBS MODERATE 35: CPT | Performed by: HOSPITALIST

## 2017-02-08 PROCEDURE — 25000132 ZZH RX MED GY IP 250 OP 250 PS 637: Performed by: PHYSICIAN ASSISTANT

## 2017-02-08 PROCEDURE — 36415 COLL VENOUS BLD VENIPUNCTURE: CPT | Performed by: INTERNAL MEDICINE

## 2017-02-08 PROCEDURE — 85027 COMPLETE CBC AUTOMATED: CPT | Performed by: INTERNAL MEDICINE

## 2017-02-08 PROCEDURE — 25000132 ZZH RX MED GY IP 250 OP 250 PS 637: Performed by: INTERNAL MEDICINE

## 2017-02-08 PROCEDURE — 80048 BASIC METABOLIC PNL TOTAL CA: CPT | Performed by: INTERNAL MEDICINE

## 2017-02-08 PROCEDURE — 25000128 H RX IP 250 OP 636: Performed by: INTERNAL MEDICINE

## 2017-02-08 PROCEDURE — 40000133 ZZH STATISTIC OT WARD VISIT

## 2017-02-08 PROCEDURE — 12000000 ZZH R&B MED SURG/OB

## 2017-02-08 PROCEDURE — 99406 BEHAV CHNG SMOKING 3-10 MIN: CPT

## 2017-02-08 RX ADMIN — HYDROMORPHONE HYDROCHLORIDE 0.2 MG: 1 INJECTION, SOLUTION INTRAMUSCULAR; INTRAVENOUS; SUBCUTANEOUS at 16:52

## 2017-02-08 RX ADMIN — HYDROMORPHONE HYDROCHLORIDE 0.5 MG: 1 INJECTION, SOLUTION INTRAMUSCULAR; INTRAVENOUS; SUBCUTANEOUS at 03:06

## 2017-02-08 RX ADMIN — OXYCODONE HYDROCHLORIDE 10 MG: 5 TABLET ORAL at 01:46

## 2017-02-08 RX ADMIN — HYDROMORPHONE HYDROCHLORIDE 0.2 MG: 1 INJECTION, SOLUTION INTRAMUSCULAR; INTRAVENOUS; SUBCUTANEOUS at 20:42

## 2017-02-08 RX ADMIN — SODIUM CHLORIDE: 9 INJECTION, SOLUTION INTRAVENOUS at 13:12

## 2017-02-08 RX ADMIN — ACETAMINOPHEN 650 MG: 325 TABLET, FILM COATED ORAL at 06:53

## 2017-02-08 RX ADMIN — SODIUM CHLORIDE: 9 INJECTION, SOLUTION INTRAVENOUS at 01:46

## 2017-02-08 RX ADMIN — HYDROMORPHONE HYDROCHLORIDE 0.2 MG: 1 INJECTION, SOLUTION INTRAMUSCULAR; INTRAVENOUS; SUBCUTANEOUS at 13:15

## 2017-02-08 RX ADMIN — OXYCODONE HYDROCHLORIDE 10 MG: 5 TABLET ORAL at 15:32

## 2017-02-08 RX ADMIN — OXYCODONE HYDROCHLORIDE 10 MG: 5 TABLET ORAL at 19:51

## 2017-02-08 RX ADMIN — ACETAMINOPHEN 650 MG: 325 TABLET, FILM COATED ORAL at 11:19

## 2017-02-08 RX ADMIN — HYDROMORPHONE HYDROCHLORIDE 0.2 MG: 1 INJECTION, SOLUTION INTRAMUSCULAR; INTRAVENOUS; SUBCUTANEOUS at 22:55

## 2017-02-08 RX ADMIN — CEFTRIAXONE 1 G: 1 INJECTION, POWDER, FOR SOLUTION INTRAMUSCULAR; INTRAVENOUS at 10:10

## 2017-02-08 RX ADMIN — ACETAMINOPHEN 650 MG: 325 TABLET, FILM COATED ORAL at 01:46

## 2017-02-08 RX ADMIN — HYDROMORPHONE HYDROCHLORIDE 0.2 MG: 1 INJECTION, SOLUTION INTRAMUSCULAR; INTRAVENOUS; SUBCUTANEOUS at 09:02

## 2017-02-08 RX ADMIN — ACETAMINOPHEN 650 MG: 325 TABLET, FILM COATED ORAL at 19:51

## 2017-02-08 RX ADMIN — OXYCODONE HYDROCHLORIDE 10 MG: 5 TABLET ORAL at 11:19

## 2017-02-08 RX ADMIN — OXYCODONE HYDROCHLORIDE 10 MG: 5 TABLET ORAL at 06:53

## 2017-02-08 RX ADMIN — ACETAMINOPHEN 650 MG: 325 TABLET, FILM COATED ORAL at 15:32

## 2017-02-08 NOTE — PROGRESS NOTES
Spiritual Assessment Progress Note  [facility] 66      PRIMARY FOCUS:      Goals of care    Symptom/pain management    Emotional/spiritual/Sabianist distress    Support for coping    ILLNESS CIRCUMSTANCES:    Reviewed documentation. Reflective conversation shared with Josh which integrated elements of illness and family narratives.         Context of Serious Illness/Symptom(s) - Pt seemed to be in some pain during the visit.  Pt admits to previous drug use and treatment but said God was helping him to stay clean.  Not sure if that is true.  Lives with mother and is resentful of how she treats him; says she is more interested in getting money from him than his health.  Pt quit high school; said he had no friends and it sounds like he doesn't have friends now.      Resources for Support - none      DISTRESS:      Emotional/ ExistentialRelational Distress - pt has no supportive relationships    Spiritual/Taoist Distress - not discussed    Social/Cultural/EconomicDistress - no job or money       SPIRITUAL/Jew (Coping):      Anabaptism/Paige - identifies as Samaritan    Spiritual Practice(s) - prayer    Emotional/Existential/ Relationall/Connections - none      GOALS OF CARE:    Goals of Care - wants to get better and get a job to support himself    Meaning/Sense-Making - not discussed      PLAN: SH to follow      Giovanna Loyd  Chaplain Resident  Pager [phone]

## 2017-02-08 NOTE — PROGRESS NOTES
"Urology Brief Follow-up Note:  Dx: right epididymitis      Subjective:  Pt found resting in bed. Pt reports he's feeling better, though right testicle is . Pt feels like the swelling has gone down \"I can see the wrinkles (in the scrotum) again\"- he couldn't yesterday.  Pt is drinking a lot of fluids to try to flush out his system. Pt reports he's voiding well and hasn't had any blood or discharge from his penis recently. Pt reports the pain meds he's getting are helping. Pt reports he thinks he should be getting antibiotics more often. However, pt then reports he would prefer to go home and come back every few hours for antibiotic infusions. When told this is not a viable option, pt asked \"how many guards are there protecting this building?\" Pt reports that when the same problem was treated [place] [facility] [place] they gave him 1 IV dose of Abx, then sent him home with PO meds. He wants this now.    Objective:  /65 mmHg  Pulse 67  Temp(Src) 98.9  F (37.2  C) (Oral)  Resp 18  Ht 1.829 m (6')  Wt 73.664 kg (162 lb 6.4 oz)  BMI 22.02 kg/m2  SpO2 96%    Intake/Output Summary (Last 24 hours) at 02/08/17 1026  Last data filed at 02/08/17 1000   Gross per 24 hour   Intake 2490.67 ml   Output   1405 ml   Net 1085.67 ml     Labs:  ROUTINE IP LABS (Last four results)  BMP  Recent Labs  Lab 02/08/17  0845      POTASSIUM 4.2   CHLORIDE 102   KRISTIN 8.4*   CO2 30   BUN 10   CR 0.58*   *     CBC  Recent Labs  Lab 02/08/17  0845 02/07/17  1005   WBC 13.4* 24.6*   RBC 3.88* 4.08*   HGB 11.7* 12.6*   HCT 35.4* 37.2*   MCV 91 91   MCH 30.2 30.9   MCHC 33.1 33.9   RDW 14.6 14.6    258     INRNo lab results found in last 7 days.    Exam:  Gen: Alert and oriented, NAD  Ab: soft, rounded, NTTP other than minor RLQ and right inguinal tenderness from the weight of the scrotum pulling, no left inguinal tenderness  : Circ penis, no urinary catheter in place, no lesions, tenderness, masses, or discharge. " Left testicle normal without erythema, edema, or drainage. Right testicle diffusely tender and swollen with an area of firmness with a volume of roughly [id number]- area notably smaller than yesterday, no fluctuance or suggestion of an abscess. Erythema has resolved  Ext: Calves soft, nttp, no edema      Assessment/Plan:  Right epididymitis, pt had left epididymitis in 2015 treated successfully with PO Levo x10 days              -Admit to hospitalist service for IV ceftriaxone              -Elevate scrotum with towels or cloths.                -Ice packs to scrotum PRN              -Ibuprofen to help with pain, swelling   -Dispo in 1-2 days pending improvement.    -Pt warned that he would likely have some residual testicular pain for several weeks  even with appropriate antibiotics and care. Pt said he understood and experienced that with previous episodes.       Please contact me with any questions or concerns.     Yesi Dickerson PA-C  [facility]  Pager:  [phone]  After 4pm and on weekends, please call [phone]

## 2017-02-08 NOTE — PROGRESS NOTES
[facility]  Hospitalist Progress Note        Rich White MD  02/08/2017        Interval History:      Feeling better,  complaining of pain at the scrotal areas with movement or light touch. No penile discharge. No fever or chills         Assessment and Plan:       Right epididymitis;  Has hx of left sided epididymitis in the past treted with Levoquine,   - - Urology following  - -  Leokocytosis improved form 89548 on admisison to 01480 today.  - -  Urine culture is pendindg  - - Continue  Ceftriaxone     --  Continue supportive cares, and per Urology      Polysubstance use disorder; most recently methamphetamine.  - -  Pt Declined CD counseling.    Tobacco use disorder;  Continue nicotine replacement therapy.    DVT prophylaxis;  Ambulatory,    Dispo: pending Urology input, likely 1 to 2 more days for IV abx. patient agrees to stay at least for one more day                 Physical Exam:       , Blood pressure 105/65, pulse 67, temperature 98.9  F (37.2  C), temperature source Oral, resp. rate 18, height 1.829 m (6'), weight 73.664 kg (162 lb 6.4 oz), SpO2 96 %.  Filed Vitals:    02/07/17 0952 02/07/17 1513   Weight: 74.844 kg (165 lb) 73.664 kg (162 lb 6.4 oz)     Vital Signs with Ranges  Temp:  [97.6  F (36.4  C)-98.9  F (37.2  C)] 98.9  F (37.2  C)  Pulse:  [60-79] 67  Resp:  [16-18] 18  BP: (102-140)/(65-86) 105/65 mmHg  SpO2:  [94 %-99 %] 96 %  I/O's Last 24 hours  I/O last 3 completed shifts:  In: 2130.67 [P.O.:1260; I.V.:870.67]  Out: 1405 [Urine:1405]    Constitutional: Alert in NAD comfortably lying in bed   Lungs: CTA no wheezing    Cardiovascular: RRR no murmurs or extra sounds   Abdomen: Soft NT/ND + BS    Skin: Warm scrotal erythema and swelling noted   :  No CVA tenderness, Scrotal swelling and redness           Medications:          cefTRIAXone  1 g Intravenous Q24H     nicotine   Transdermal Q8H     nicotine   Transdermal Daily     nicotine  1 patch Transdermal Daily      influenza quadrivalent (PF) vacc age 3 yrs and older  0.5 mL Intramuscular Prior to discharge     pneumococcal vaccine  0.5 mL Intramuscular Prior to discharge     PRN Meds: naloxone, prochlorperazine **OR** prochlorperazine **OR** prochlorperazine, ondansetron **OR** ondansetron, senna-docusate, polyethylene glycol, bisacodyl, HYDROmorphone, acetaminophen, acetaminophen, oxyCODONE, ibuprofen         Data:      All new lab and imaging data was reviewed.   Recent Labs   Lab Test  02/08/17   0845  02/07/17   1005   WBC  13.4*  24.6*   HGB  11.7*  12.6*   MCV  91  91   PLT  229  258      No lab results found.  No lab results found.    Invalid input(s): ALEXANDR CARNEY MD  Internal Medicine, Hospitalist  Pager#; [phone]

## 2017-02-08 NOTE — PROGRESS NOTES
"   02/08/17 1100   General Information   Patient is receptive to smoking cessation at this time Yes  (\"I've gone months in the past without smoking\")   Packs Per Day 0.5 PPD  (to 1 ppd)   Years Smoked (#) 25 yrs   Cigarette Pack Years 12.5   Stage of Behavior Change   Patient's Stage of Behavior Change Contemplation \"I might (within the next 60 days\"   Processes of Change   The following interventions were used (smoking cessation) Increase awareness of how smoking affects others;Increase awareness of effects of smoking on health;Develop strategies to increase confidence to quit;Problem-solve barriers to quitting;Develop action plan;Identify healthy alternatives;Recognize rewards of value to him/her   Motivation to Quit Scale (1-10) 9   Confidence to Quit Scale (1-10) 0  (\"I'm motivated, but not confident.\")   Education/Recommendations   Education Other (Comment)  (Help for Smokers)   Treatment Time (Minutes) 5 minutes   Total Evaluation Time   Total Evaluation Time (Minutes) 5     "

## 2017-02-08 NOTE — PLAN OF CARE
Problem: Goal Outcome Summary  Goal: Goal Outcome Summary  Outcome: Improving  Patient A&Ox4.  Up SBA.  Vitals stable, denies fevers or chills.  Scrotum swollen and red, elevated.  IV Dilaudid given twice for pain, Oxycodone given once.  Denies nausea.  On IV Rocephin, IVF's.  WBC improving 13.4 today.  Urology following.  Continue to monitor.

## 2017-02-08 NOTE — PLAN OF CARE
Problem: Goal Outcome Summary  Goal: Goal Outcome Summary  Outcome: No Change  8385-4020: VSS, afebrile, O2 wnl RA. A&Ox4, calm and cooperative. SBA/FR. Tolerating reg diet, good appetite. Rt scrotal edema/redness/firm and tender to touch. Prn Oxy x1 and Tylenol x1 given for scrotal pain 7/10 , elevated on towel, declined ice pack. Voiding adequately. Nicotine patch on left arm. On IV Rocep q24h. IVF infusing 100 mL/hr. DC pending. RN will cont to monitor.

## 2017-02-08 NOTE — PLAN OF CARE
Problem: Infection, Risk/Actual (Adult)  Goal: Identify Related Risk Factors and Signs and Symptoms  Related risk factors and signs and symptoms are identified upon initiation of Human Response Clinical Practice Guideline (CPG)   Outcome: Improving   Right scrotal edema present, getting good relief with oxycodone and elevation.

## 2017-02-08 NOTE — PLAN OF CARE
Problem: Goal Outcome Summary  Goal: Goal Outcome Summary  Outcome: No Change  A&O X 4, up with 1 assist, uses urinal, scrotum swollen and tender to touch, elevated, complain of scrotum pain, oxycodone, tylenol, and dilaudid given x 1 with relief, patient removed and refused nicotine patch, stated it's not helping him and he decides to quit smoking on his own, vital signs stable on RA, IVF at 100 ml/hr.

## 2017-02-09 VITALS
WEIGHT: 168.9 LBS | BODY MASS INDEX: 22.88 KG/M2 | OXYGEN SATURATION: 99 % | HEIGHT: 72 IN | RESPIRATION RATE: 18 BRPM | TEMPERATURE: 98 F | SYSTOLIC BLOOD PRESSURE: 105 MMHG | DIASTOLIC BLOOD PRESSURE: 43 MMHG | HEART RATE: 73 BPM

## 2017-02-09 LAB
ANION GAP SERPL CALCULATED.3IONS-SCNC: 5 MMOL/L (ref 3–14)
BUN SERPL-MCNC: 9 MG/DL (ref 7–30)
CALCIUM SERPL-MCNC: 8.6 MG/DL (ref 8.5–10.1)
CHLORIDE SERPL-SCNC: 103 MMOL/L (ref 94–109)
CO2 SERPL-SCNC: 29 MMOL/L (ref 20–32)
CREAT SERPL-MCNC: 0.65 MG/DL (ref 0.66–1.25)
ERYTHROCYTE [DISTWIDTH] IN BLOOD BY AUTOMATED COUNT: 14.3 % (ref 10–15)
GFR SERPL CREATININE-BSD FRML MDRD: ABNORMAL ML/MIN/1.7M2
GLUCOSE SERPL-MCNC: 94 MG/DL (ref 70–99)
HCT VFR BLD AUTO: 36.8 % (ref 40–53)
HGB BLD-MCNC: 12.1 G/DL (ref 13.3–17.7)
MCH RBC QN AUTO: 30 PG (ref 26.5–33)
MCHC RBC AUTO-ENTMCNC: 32.9 G/DL (ref 31.5–36.5)
MCV RBC AUTO: 91 FL (ref 78–100)
PLATELET # BLD AUTO: 240 10E9/L (ref 150–450)
POTASSIUM SERPL-SCNC: 4.4 MMOL/L (ref 3.4–5.3)
RBC # BLD AUTO: 4.04 10E12/L (ref 4.4–5.9)
SODIUM SERPL-SCNC: 137 MMOL/L (ref 133–144)
WBC # BLD AUTO: 6.8 10E9/L (ref 4–11)

## 2017-02-09 PROCEDURE — 25000132 ZZH RX MED GY IP 250 OP 250 PS 637: Performed by: INTERNAL MEDICINE

## 2017-02-09 PROCEDURE — 85027 COMPLETE CBC AUTOMATED: CPT | Performed by: HOSPITALIST

## 2017-02-09 PROCEDURE — 25000132 ZZH RX MED GY IP 250 OP 250 PS 637: Performed by: PHYSICIAN ASSISTANT

## 2017-02-09 PROCEDURE — 80048 BASIC METABOLIC PNL TOTAL CA: CPT | Performed by: HOSPITALIST

## 2017-02-09 PROCEDURE — 36415 COLL VENOUS BLD VENIPUNCTURE: CPT | Performed by: HOSPITALIST

## 2017-02-09 PROCEDURE — 25000128 H RX IP 250 OP 636: Performed by: INTERNAL MEDICINE

## 2017-02-09 PROCEDURE — 99239 HOSP IP/OBS DSCHRG MGMT >30: CPT | Performed by: HOSPITALIST

## 2017-02-09 RX ORDER — OXYCODONE AND ACETAMINOPHEN 5; 325 MG/1; MG/1
1 TABLET ORAL EVERY 6 HOURS PRN
Qty: 15 TABLET | Refills: 0 | Status: SHIPPED | OUTPATIENT
Start: 2017-02-09 | End: 2017-02-09

## 2017-02-09 RX ORDER — IBUPROFEN 600 MG/1
600 TABLET, FILM COATED ORAL EVERY 6 HOURS PRN
Qty: 30 TABLET | Refills: 0 | Status: SHIPPED | OUTPATIENT
Start: 2017-02-09

## 2017-02-09 RX ORDER — LEVOFLOXACIN 500 MG/1
500 TABLET, FILM COATED ORAL DAILY
Qty: 10 TABLET | Refills: 0 | Status: SHIPPED | OUTPATIENT
Start: 2017-02-09

## 2017-02-09 RX ADMIN — ACETAMINOPHEN 650 MG: 325 TABLET, FILM COATED ORAL at 04:19

## 2017-02-09 RX ADMIN — SODIUM CHLORIDE: 9 INJECTION, SOLUTION INTRAVENOUS at 02:31

## 2017-02-09 RX ADMIN — HYDROMORPHONE HYDROCHLORIDE 0.2 MG: 1 INJECTION, SOLUTION INTRAMUSCULAR; INTRAVENOUS; SUBCUTANEOUS at 02:29

## 2017-02-09 RX ADMIN — CEFTRIAXONE 1 G: 1 INJECTION, POWDER, FOR SOLUTION INTRAMUSCULAR; INTRAVENOUS at 10:28

## 2017-02-09 RX ADMIN — OXYCODONE HYDROCHLORIDE 10 MG: 5 TABLET ORAL at 10:29

## 2017-02-09 RX ADMIN — OXYCODONE HYDROCHLORIDE 10 MG: 5 TABLET ORAL at 04:19

## 2017-02-09 RX ADMIN — HYDROMORPHONE HYDROCHLORIDE 0.2 MG: 1 INJECTION, SOLUTION INTRAMUSCULAR; INTRAVENOUS; SUBCUTANEOUS at 06:04

## 2017-02-09 RX ADMIN — OXYCODONE HYDROCHLORIDE 10 MG: 5 TABLET ORAL at 00:07

## 2017-02-09 RX ADMIN — ACETAMINOPHEN 325 MG: 325 TABLET, FILM COATED ORAL at 00:07

## 2017-02-09 ASSESSMENT — PAIN DESCRIPTION - DESCRIPTORS
DESCRIPTORS: ACHING

## 2017-02-09 NOTE — PROGRESS NOTES
Urology Brief Follow-up Note:  Dx: right epididymitis      Subjective:  Pt found resting in bed. Pt reports he's feeling better, though right testicle is still a little tender. Pt feels like the swelling has gone down and he again wants to go home. Pt says he's had this before and he knows it takes 3 weeks to fully go away and he knows how to take care of it.  Pt is drinking a lot of fluids to try to flush out his system. He's doing well with PO intake and reports no nausea or vomiting. Pt reports he's voiding well and hasn't had any blood or discharge from his penis recently. Pt reports the pain meds he's getting are helping, though he's going to try to use just 1-2 PO tabs per day. Pt feels that since his white count is normal and he feels better he should be fine to D/C and F/U in 1 week.    Objective:  /43 mmHg  Pulse 73  Temp(Src) 98  F (36.7  C) (Oral)  Resp 18  Ht 1.829 m (6')  Wt 76.613 kg (168 lb 14.4 oz)  BMI 22.90 kg/m2  SpO2 99%    Intake/Output Summary (Last 24 hours) at 02/09/17 0909  Last data filed at 02/09/17 0736   Gross per 24 hour   Intake   3858 ml   Output   3175 ml   Net    683 ml     Labs:  ROUTINE IP LABS (Last four results)  BMP    Recent Labs  Lab 02/09/17  0820 02/08/17  0845    137   POTASSIUM 4.4 4.2   CHLORIDE 103 102   KRISTIN 8.6 8.4*   CO2 29 30   BUN 9 10   CR 0.65* 0.58*   GLC 94 108*     CBC    Recent Labs  Lab 02/09/17  0820 02/08/17  0845 02/07/17  1005   WBC 6.8 13.4* 24.6*   RBC 4.04* 3.88* 4.08*   HGB 12.1* 11.7* 12.6*   HCT 36.8* 35.4* 37.2*   MCV 91 91 91   MCH 30.0 30.2 30.9   MCHC 32.9 33.1 33.9   RDW 14.3 14.6 14.6    229 258     INRNo lab results found in last 7 days.    Exam:  Gen: Alert and oriented, NAD  Ab: soft, rounded, NTTP other than minor RLQ and right inguinal tenderness from the weight of the scrotum pulling, no left inguinal tenderness  : Circ penis, no urinary catheter in place, no lesions, tenderness, masses, or discharge. Left  testicle normal without erythema, edema, or drainage. Right testicle diffusely tender and swollen with an area of firmness with a volume of roughly [id number] area about the same size as yesterday, no fluctuance or suggestion of an abscess. Erythema has resolved  Ext: Calves soft, nttp, no edema      Assessment/Plan:  Right epididymitis, pt had left epididymitis in 2015 treated successfully with PO Levo x10 days              -Continue IV ceftriaxone today              -Elevate scrotum with towels or cloths, will order a jock suporter as well              -Ice packs to scrotum PRN. Best rest to promote scrotal elevation              -Ibuprofen to help with pain, swelling   -Dispo later today per hospitalist service. Pt should be seent home with 2 weeks of PO Abx, Keflex or Levo ok per 2/4 UC and past experience   -Will arrange F/U with Dr. Gonzalez in 1-2 weeks   -Pt warned that he would likely have some residual testicular pain for several weeks  even with appropriate antibiotics and care. Pt said he understood and experienced that with previous episodes.     Please contact me with any questions or concerns.     Yesi Dickerson PA-C  [facility]  Pager:  [phone]  After 4pm and on weekends, please call [phone]

## 2017-02-09 NOTE — DISCHARGE SUMMARY
[facility]  Discharge Summary        Frandy Lemus MRN# [id number]   YOB: 1970 Age: 46 year old     Date of Admission:  2/7/2017  Date of Discharge:  2/9/2017  Admitting Physician:  Alexandro Zambrano MD  Discharge Physician: Rich White MD  Discharging Service: Hospitalist     Primary Provider: Yamile Robles  Primary Care Physician Phone Number: [phone]         Discharge Diagnoses/Problem Oriented Hospital Course (Providers):    Frandy Lemus was admitted on 2/7/2017 by Alexandro Zambrano MD and I would refer you to their history and physical.  The following problems were addressed during his hospitalization:  In brief, Frandy Lemus is a 46 year old male who presents today for right sided scrotal swelling thought to be secondary to acute orchitis an epididymitis.  Of note, patient had prior episode of epididymitis on the right testicle , urine culture then grew pan sensitive Klebsiella Pneumoniae . Urology service was consulted and followed him during the hospital stay, he was initially managed with intravenous  antibiotics namely Ceftriaxone, and discharged with Levofloxacin to complete two weeks course. He ha follow appointment in Urology clinic. patient verbalized understanding of the plan of care after discharge including completion of antibiotic course and follow up appointment.  His other problems includes polysbastanc use disorder and active tobacco and  methamphetamine use, he declined  Chemical dependency counseling.              Code Status:      Full Code        Brief Hospital Stay Summary Sent Home With Patient in AVS:        Reason for your hospital stay       Acute orchitis and epididymitis                        Important Results:      See below        Pending Results:        Unresulted Labs Ordered in the Past 30 Days of this Admission     No orders found from 12/10/2016 to 2/8/2017.            Discharge Instructions and Follow-Up:             Follow-up Appointments     Follow Up       Please follow up with Dr. Gonzalez on Tuesday, Februarys 21st at 3:20pm for   a routine post-hospital check and check of the right epididymitis. Please   arrive 10-15 minutes prior to your scheduled appointment with a photo ID   and a copy of your insurance card to fill out paperwork.     [facility]  [address], [address]  [place]. [address]    You may call [phone] with any questions or concerns.            Follow-up and recommended labs and tests        Follow up with primary care provider, Yamile Allen, within 7 days for   hospital follow- up.  No follow up labs or test are needed.                      Discharge Disposition:      Discharged to home        Discharge Medications:        Current Discharge Medication List      START taking these medications    Details   levofloxacin (LEVAQUIN) 500 MG tablet Take 1 tablet (500 mg) by mouth daily  Qty: 10 tablet, Refills: 0    Associated Diagnoses: Orchitis of right testicle         CONTINUE these medications which have CHANGED    Details   ibuprofen (ADVIL/MOTRIN) 600 MG tablet Take 1 tablet (600 mg) by mouth every 6 hours as needed for moderate pain  Qty: 30 tablet, Refills: 0    Associated Diagnoses: Orchitis of right testicle         STOP taking these medications       doxycycline (VIBRAMYCIN) 100 MG capsule Comments:   Reason for Stopping:         oxyCODONE (ROXICODONE) 5 MG IR tablet Comments:   Reason for Stopping:                 Allergies:       No Known Allergies        Consultations This Hospital Stay:      Consultation during this admission received from urology        Condition and Physical on Discharge:      Discharge condition: Stable   Vitals:  , Blood pressure 105/43, pulse 73, temperature 98  F (36.7  C), temperature source Oral, resp. rate 18, height 1.829 m (6'), weight 76.613 kg (168 lb 14.4 oz), SpO2 99 %.     Constitutional: Alert in NAD     Lungs: CTA bilateraly no  wheezing or crackles   Cardiovascular:  RRR no murmurs   Abdomen:  Soft NT/ND + BS   Skin: Warm dry, scrotal area swollen      swelling of of scrotal and erythema noted, ( improved since admission)          Discharge Time:      Discharge process has taken greater than 30 minutes.        Image Results From This Hospital Stay (For Non-EPIC Providers):        Results for orders placed or performed during the hospital encounter of 02/07/17   US Testicular & Scrotum w Doppler Ltd    Narrative    ULTRASOUND TESTICULAR AND SCROTUM WITH DOPPLER LIMITED  2/7/2017 12:14  PM     HISTORY: Severe right testicular pain and swelling. Evaluate for  torsion, orchitis or epididymitis.    COMPARISON: 2/4/2017.    FINDINGS: The testicles remain normal in size and morphology  bilaterally. Interval development of moderately increased blood flow  in the right testicle consistent with orchitis. Interval development  of abnormal increased blood flow in the right epididymis consistent  with epididymitis. There has also been interval development of a small  right hydrocele with multiple septations which could indicate that it  is infected. The left epididymis is normal. No left hydrocele. No  varicocele bilaterally.      Impression    IMPRESSION:  1. Interval development of right-sided epididymoorchitis.  2. New right-sided hydrocele containing septations which could  indicate infection.    TAI SEBASTIAN MD           Most Recent Lab Results In EPIC (For Non-EPIC Providers):    Most Recent 3 CBC's:  Recent Labs   Lab Test  02/09/17   0820  02/08/17   0845  02/07/17   1005   WBC  6.8  13.4*  24.6*   HGB  12.1*  11.7*  12.6*   MCV  91  91  91   PLT  240  229  258      Most Recent 3 BMP's:  Recent Labs   Lab Test  02/09/17   0820  02/08/17   0845   NA  137  137   POTASSIUM  4.4  4.2   CHLORIDE  103  102   CO2  29  30   BUN  9  10   CR  0.65*  0.58*   ANIONGAP  5  5   KRISTIN  8.6  8.4*   GLC  94  108*     Most Recent 3 Troponin's:No lab results  found.    Invalid input(s): TROP, TROPONINIES  Most Recent 3 INR's:No lab results found.  Most Recent 2 LFT's:No lab results found.  Most Recent Cholesterol Panel:No lab results found.  Most Recent 6 Bacteria Isolates From Any Culture (See EPIC Reports for Culture Details):  Recent Labs   Lab Test  02/07/17   1125  02/04/17   1015   CULT  No growth  50,000 to 100,000 colonies/mL Klebsiella pneumoniae*     Most Recent TSH, T4 and HgbA1c: No lab results found.

## 2017-02-09 NOTE — PLAN OF CARE
Problem: Goal Outcome Summary  Goal: Goal Outcome Summary  Outcome: Adequate for Discharge Date Met:  02/09/17  Patient A&Ox4.  Up SBA/Independent.  Showered.  Vitals stable.  C/o scrotal pain, but improved, Oxycodone given once.  Scrotum swollen, but improved.  Scrotal sling ordered.  IV antibiotic given.  Plan for discharge home today with oral antibiotics.  Reviewed discharge instructions with patient with understanding.

## 2017-02-09 NOTE — PLAN OF CARE
Problem: Goal Outcome Summary  Goal: Goal Outcome Summary  Outcome: Improving  VSS, O2 wnl RA. A&Ox4. SBA/FR. A&Ox4. Tolerating reg diet, good appetite. Scrotum swollen, red and tender to touch, elevated. Prn Oxy, Tylenol and IV dilaudid for pain. WBC 13.4 (24.6 on 2/7) Showered. On IV Rocep. IVF @ 100 mL/hr. Possible dc in 1-2 days. RN will cont to monitor.

## 2017-02-09 NOTE — PLAN OF CARE
Problem: Goal Outcome Summary  Goal: Goal Outcome Summary  Outcome: No Change  Pt AO x 4. VSS on room air. Up with assist of one. Continues with scrotal pain/swelling, oxy, apap, dilaudid administered throughout night. Pt reports effective. Pt refusing ice/elevation of scrotum. On IV rocephin and IVF 100ml/hr. Will continue to monitor.

## 2022-01-08 ENCOUNTER — APPOINTMENT (OUTPATIENT)
Dept: ULTRASOUND IMAGING | Facility: CLINIC | Age: 52
End: 2022-01-08
Attending: PHYSICIAN ASSISTANT

## 2022-01-08 ENCOUNTER — HOSPITAL ENCOUNTER (EMERGENCY)
Facility: CLINIC | Age: 52
Discharge: HOME | End: 2022-01-08
Attending: PHYSICIAN ASSISTANT | Admitting: PHYSICIAN ASSISTANT

## 2022-01-08 VITALS
BODY MASS INDEX: 21.67 KG/M2 | HEIGHT: 72 IN | DIASTOLIC BLOOD PRESSURE: 80 MMHG | TEMPERATURE: 98.4 F | OXYGEN SATURATION: 96 % | HEART RATE: 80 BPM | SYSTOLIC BLOOD PRESSURE: 105 MMHG | WEIGHT: 160 LBS | RESPIRATION RATE: 20 BRPM

## 2022-01-08 DIAGNOSIS — N50.89 EDEMA OF SCROTUM: ICD-10-CM

## 2022-01-08 DIAGNOSIS — N39.0 URINARY TRACT INFECTION: ICD-10-CM

## 2022-01-08 LAB
ALBUMIN UR-MCNC: 50 MG/DL
APPEARANCE UR: ABNORMAL
BACTERIA #/AREA URNS HPF: ABNORMAL /HPF
BILIRUB UR QL STRIP: NEGATIVE
COLOR UR AUTO: YELLOW
GLUCOSE UR STRIP-MCNC: NEGATIVE MG/DL
HGB UR QL STRIP: NEGATIVE
KETONES UR STRIP-MCNC: NEGATIVE MG/DL
LEUKOCYTE ESTERASE UR QL STRIP: ABNORMAL
MUCOUS THREADS #/AREA URNS LPF: PRESENT /LPF
NITRATE UR QL: NEGATIVE
PH UR STRIP: 6 [PH] (ref 5–7)
RBC URINE: 17 /HPF
SP GR UR STRIP: 1.03 (ref 1–1.03)
SQUAMOUS EPITHELIAL: 7 /HPF
UROBILINOGEN UR STRIP-MCNC: NORMAL MG/DL
WBC URINE: >182 /HPF

## 2022-01-08 PROCEDURE — 81001 URINALYSIS AUTO W/SCOPE: CPT | Performed by: PHYSICIAN ASSISTANT

## 2022-01-08 PROCEDURE — 87491 CHLMYD TRACH DNA AMP PROBE: CPT | Performed by: PHYSICIAN ASSISTANT

## 2022-01-08 PROCEDURE — 99284 EMERGENCY DEPT VISIT MOD MDM: CPT | Mod: 25

## 2022-01-08 PROCEDURE — 87086 URINE CULTURE/COLONY COUNT: CPT | Performed by: PHYSICIAN ASSISTANT

## 2022-01-08 PROCEDURE — 87591 N.GONORRHOEAE DNA AMP PROB: CPT | Performed by: PHYSICIAN ASSISTANT

## 2022-01-08 PROCEDURE — 76870 US EXAM SCROTUM: CPT

## 2022-01-08 RX ORDER — CEPHALEXIN 500 MG/1
500 CAPSULE ORAL 4 TIMES DAILY
Qty: 28 CAPSULE | Refills: 0 | Status: SHIPPED | OUTPATIENT
Start: 2022-01-08 | End: 2022-01-15

## 2022-01-08 ASSESSMENT — ENCOUNTER SYMPTOMS
DIFFICULTY URINATING: 0
FEVER: 0
CHILLS: 0

## 2022-01-08 ASSESSMENT — MIFFLIN-ST. JEOR: SCORE: 1618.76

## 2022-01-08 NOTE — ED PROVIDER NOTES
History   Chief Complaint:  Groin Pain and Groin Swelling       HPI   Frandy Lemus is a 51 year old male with history of epididymitis who presents with a left testicular pain and swelling. The patient reports developing left sided testicular pain and swelling for about 4 days now, and adds that it feels just like his previous epididymitis episodes. The patient stated that he was using a ring that he placer around his scrotum during a sexual play and he felt some stinging once he pulled it off, last time he used it about 4-5 days ago, but denies any other specific injuries. He denies any penile discharge or urinary symptoms as well as fever or chills and he has no concerns about any STD's.     Hospital summary from admission 2/7/17 - 2/9/2017  In brief, Frandy Lemus is a 46 year old male who presents today for right sided scrotal swelling thought to be secondary to acute orchitis an epididymitis.  Of note, patient had prior episode of epididymitis on the right testicle , urine culture then grew pan sensitive Klebsiella Pneumoniae . Urology service was consulted and followed him during the hospital stay, he was initially managed with intravenous  antibiotics namely Ceftriaxone, and discharged with Levofloxacin to complete two weeks course. He ha follow appointment in Urology clinic. patient verbalized understanding of the plan of care after discharge including completion of antibiotic course and follow up appointment.  His other problems includes polysbastanc use disorder and active tobacco and  methamphetamine use, he declined  Chemical dependency counseling.    Review of Systems   Constitutional: Negative for chills and fever.   Genitourinary: Positive for scrotal swelling and testicular pain. Negative for difficulty urinating.   All other systems reviewed and are negative.    Allergies:  The patient has no known allergies.     Medications:  ibuprofen   levofloxacin     Past Medical History:     epididymitis      Past Surgical History:    The patient denies past surgical history.     Family History:    The patient denies past family history.     Social History:  The patient was brought to the emergency department by private vehicle.  The patient presents to the emergency department alone.    Physical Exam     Patient Vitals for the past 24 hrs:   BP Temp Temp src Pulse Resp SpO2 Height Weight   01/08/22 1113 111/62 98.4  F (36.9  C) Oral 85 20 99 % 1.829 m (6') 72.6 kg (160 lb)     Physical Exam  Constitutional: Pleasant. Cooperative.  Eyes: Pupils equally round  HENT: Head is normal in appearance. Oropharynx is normal with moist mucus membranes.  Cardiovascular: Regular rate and rhythm without murmurs.  Respiratory: Normal respiratory effort, lungs clear to auscultation  Musculoskeletal: No asymmetry  Skin: Normal, without rash.  Neurologic: Cranial nerves grossly intact, normal cognition, no apparent deficits.  Psychiatric: Normal affect.  :              Enlarged, tender scrotum, predominantly to left side.              No penile discharge              Circumcised              Right testicle appears non-swollen. No right testicular tenderness. No masses palpated.  Nursing notes and vital signs reviewed.    Emergency Department Course     Imaging:  US Testicular & Scrotum w Doppler Ltd  1.  Large left hydrocele.  2.  Mild bilateral diffuse heterogenous testicular parenchyma, not significantly changed as compared to 02/07/2017 exam, indeterminate, could be sequela of prior infection.  3.  Diffuse scrotal skin thickening/edema.    Report per radiology    Laboratory:  Labs Ordered and Resulted from Time of ED Arrival to Time of ED Departure   ROUTINE UA WITH MICROSCOPIC REFLEX TO CULTURE - Abnormal       Result Value    Color Urine Yellow      Appearance Urine Slightly Cloudy (*)     Glucose Urine Negative      Bilirubin Urine Negative      Ketones Urine Negative      Specific Gravity Urine 1.032       Blood Urine Negative      pH Urine 6.0      Protein Albumin Urine 50  (*)     Urobilinogen Urine Normal      Nitrite Urine Negative      Leukocyte Esterase Urine Large (*)     Bacteria Urine Moderate (*)     Mucus Urine Present (*)     RBC Urine 17 (*)     WBC Urine >182 (*)     Squamous Epithelials Urine 7 (*)    URINE CULTURE   CHLAMYDIA TRACHOMATIS PCR   NEISSERIA GONORRHOEAE PCR      Emergency Department Course:    Reviewed:  I reviewed nursing notes, vitals and past medical history    Assessments/Consults  12:22AM I obtained history and examined the patient as noted above.  2:36PM I spoke to Dr Faye of urology regarding patient's care.   2:45 PM I reassessed the patient and explained findings.     Disposition:  The patient was discharged to home.     Impression & Plan     Medical Decision Making:  Frandy Lemus is a 51 year old male who presents to the ED for evaluation of left testicular pain and swelling after using a sexual device and causing trauma to his scrotum. He notes a history of epididymitis and is concerned for recurrence. See HPI as above for additional details. Vitals and physical exam as above. Differential was broad and included orchitis, epididymitis, testicular torsion, testicular rupture, hernia, among others. Imaging as above shows presence of large hydrocele. Patient also noted mild dysuria on recheck, and UA did show signs of infection. He denies any concern for STI. Will test for STIs just in case. Will cover patient with Keflex for home with urine culture pending. Advised against using sexual device in the future. Tylenol and ibuprofen for pain. Discussed reasons to return. All questions answered. Patient discharged to home in stable condition.     Diagnosis:    ICD-10-CM    1. Urinary tract infection  N39.0    2. Edema of scrotum  N50.89      Scribe Disclosure:  Roge GALO, am serving as a scribe at 11:42 AM on 1/8/2022 to document services personally performed by  Raciel Bella PA-C based on my observations and the provider's statements to me.     This record was created at least in part using electronic voice recognition software, so please excuse any typographical errors.             Raciel Bella PA-C  01/08/22 6821

## 2022-01-08 NOTE — ED TRIAGE NOTES
Left testicular pain and swelling over last couple days. Last night reported chills. Patient reports he has hx of epididymitis.

## 2022-01-08 NOTE — DISCHARGE INSTRUCTIONS
Take antibiotics as prescribed.  Avoid using ring device in manner described to avoid trauma to the scrotum.

## 2022-01-09 LAB
BACTERIA UR CULT: NORMAL
C TRACH DNA SPEC QL NAA+PROBE: NEGATIVE
N GONORRHOEA DNA SPEC QL NAA+PROBE: POSITIVE